# Patient Record
Sex: FEMALE | Race: WHITE | NOT HISPANIC OR LATINO | Employment: OTHER | URBAN - METROPOLITAN AREA
[De-identification: names, ages, dates, MRNs, and addresses within clinical notes are randomized per-mention and may not be internally consistent; named-entity substitution may affect disease eponyms.]

---

## 2023-12-11 ENCOUNTER — DOCUMENTATION ONLY (OUTPATIENT)
Dept: OTHER | Facility: CLINIC | Age: 68
End: 2023-12-11

## 2023-12-11 PROBLEM — S39.012A LUMBOSACRAL STRAIN, INITIAL ENCOUNTER: Status: ACTIVE | Noted: 2023-12-11

## 2023-12-11 PROBLEM — E66.3 OVERWEIGHT (BMI 25.0-29.9): Status: ACTIVE | Noted: 2023-12-11

## 2023-12-11 PROBLEM — R46.89 COGNITIVE AND BEHAVIORAL CHANGES: Status: ACTIVE | Noted: 2023-12-11

## 2023-12-11 PROBLEM — M70.61 TROCHANTERIC BURSITIS OF RIGHT HIP: Status: ACTIVE | Noted: 2023-12-11

## 2023-12-11 PROBLEM — F03.90 DEMENTIA WITHOUT BEHAVIORAL DISTURBANCE (H): Status: ACTIVE | Noted: 2023-12-11

## 2023-12-11 PROBLEM — R41.89 COGNITIVE AND BEHAVIORAL CHANGES: Status: ACTIVE | Noted: 2023-12-11

## 2023-12-11 PROBLEM — M25.552 PAIN OF LEFT HIP: Status: ACTIVE | Noted: 2023-12-11

## 2023-12-11 PROBLEM — Z87.898 HX OF ABNORMAL MAMMOGRAM: Status: ACTIVE | Noted: 2023-12-11

## 2024-01-03 ENCOUNTER — DOCUMENTATION ONLY (OUTPATIENT)
Dept: GERIATRICS | Facility: CLINIC | Age: 69
End: 2024-01-03

## 2024-01-03 ENCOUNTER — ASSISTED LIVING VISIT (OUTPATIENT)
Dept: GERIATRICS | Facility: CLINIC | Age: 69
End: 2024-01-03
Payer: MEDICARE

## 2024-01-03 VITALS
TEMPERATURE: 97.3 F | HEART RATE: 74 BPM | BODY MASS INDEX: 27.38 KG/M2 | RESPIRATION RATE: 20 BRPM | OXYGEN SATURATION: 95 % | HEIGHT: 61 IN | DIASTOLIC BLOOD PRESSURE: 59 MMHG | WEIGHT: 145 LBS | SYSTOLIC BLOOD PRESSURE: 118 MMHG

## 2024-01-03 DIAGNOSIS — E03.9 ACQUIRED HYPOTHYROIDISM: ICD-10-CM

## 2024-01-03 DIAGNOSIS — E66.3 OVERWEIGHT (BMI 25.0-29.9): ICD-10-CM

## 2024-01-03 DIAGNOSIS — I10 BENIGN ESSENTIAL HYPERTENSION: ICD-10-CM

## 2024-01-03 DIAGNOSIS — K21.00 GASTROESOPHAGEAL REFLUX DISEASE WITH ESOPHAGITIS WITHOUT HEMORRHAGE: ICD-10-CM

## 2024-01-03 DIAGNOSIS — R41.89 COGNITIVE AND BEHAVIORAL CHANGES: Primary | ICD-10-CM

## 2024-01-03 DIAGNOSIS — R46.89 COGNITIVE AND BEHAVIORAL CHANGES: Primary | ICD-10-CM

## 2024-01-03 PROCEDURE — 99345 HOME/RES VST NEW HIGH MDM 75: CPT | Performed by: NURSE PRACTITIONER

## 2024-01-03 RX ORDER — TRAZODONE HYDROCHLORIDE 50 MG/1
75 TABLET, FILM COATED ORAL AT BEDTIME
COMMUNITY
End: 2024-02-09

## 2024-01-03 RX ORDER — IBUPROFEN 200 MG
200-400 TABLET ORAL 3 TIMES DAILY PRN
COMMUNITY

## 2024-01-03 NOTE — LETTER
1/3/2024        RE: Milena Kim  [address] [facility] [address]        [facility] GERIATRICS    PRIMARY CARE PROVIDER AND CLINIC:  Morena Omer NP, [address] [place] [id number]  Chief Complaint   Patient presents with     [facility] Medical Record Number:  [id number]  Place of Service where encounter took place:  [facility] ([facility]) [[id number]]    Milena Kim  is a 68 year old  (1955), admitted to the above facility from  [facility]. Hospital stay 11/5/23 through 12/11/23..    HPI:    Patient resting in room upon visit. Has a history of dementia with falls. Recently evaluated in ED on 11/5/23 for mechanical fall with hip pain, x-ray neg for fracture. HPI difficult to obtain 2/2 baseline cognitive impairment. Staff report negative behaviors but difficulty sleeping throughout night.   -Able to recall family and past events. Was an  for several years and enjoyed her work.   -Denies CP, SOB and lightheadedness.   BP Readings from Last 3 Encounters:   01/03/24 118/59   12/11/23 130/68     Pulse Readings from Last 4 Encounters:   01/03/24 74   12/11/23 83     Wt Readings from Last 4 Encounters:   01/03/24 65.8 kg (145 lb)   12/11/23 65.3 kg (144 lb)       CODE STATUS/ADVANCE DIRECTIVES DISCUSSION:  No Order  CPR/Full code   ALLERGIES:   Allergies   Allergen Reactions     Penicillins       PAST MEDICAL HISTORY:   Past Medical History:   Diagnosis Date     Aftercare following right hip joint replacement surgery      Benign essential HTN      Cognitive and behavioral changes      Dementia without behavioral disturbance, psychotic disturbance, mood disturbance, or anxiety (H)      Ductal carcinoma in situ of right breast      Elevated platelet count      Gastroesophageal reflux disease, unspecified whether esophagitis present      Greater trochanteric pain syndrome of right lower  extremity      History of abnormal mammogram      Hyperlipemia      Hypothyroidism      Lumbosacral strain, initial encounter      Osteoarthrosis      Osteopenia      Overweight (BMI 25.0-29.9)      Primary localized osteoarthrosis of the hip, right      Recurrent major depression (H24)     Managed by [facility]     Thyroid nodule     FNA Negitive 8/5/21     Trochanteric bursitis of right hip      Visit for screening mammogram       PAST SURGICAL HISTORY:   has a past surgical history that includes appendectomy; Gallbladder surgery; hip surgery; Hysterectomy; Lumpectomy breast (Right, 10/13/2023); oral surgery tooth extraction wisdom; sinsus surgery; and tonsillectomy.  FAMILY HISTORY: family history includes Diabetes in her father; Hypertension in her father; Osteoarthritis in her father, maternal grandmother, and mother; Osteoporosis in her maternal grandmother and mother.  SOCIAL HISTORY:   reports that she has never smoked. She has never used smokeless tobacco. She reports that she does not drink alcohol and does not use drugs.  Patient's living condition: lives alone    Post Discharge Medication Reconciliation Status:   MED REC REQUIRED  Post Medication Reconciliation Status: patient was not discharged from an inpatient facility or TCU       Current Outpatient Medications   Medication Sig     donepezil (ARICEPT) 10 MG tablet Take 10 mg by mouth daily     fexofenadine (ALLEGRA) 180 MG tablet Take 180 mg by mouth daily as needed for allergies     ibuprofen (ADVIL/MOTRIN) 200 MG tablet Take 200-400 mg by mouth 3 times daily as needed for pain     levothyroxine (SYNTHROID/LEVOTHROID) 50 MCG tablet Take 50 mcg by mouth daily     Lidocaine (LIDOCARE) 4 % Patch Place 1 patch onto the skin every 24 hours To prevent lidocaine toxicity, patient should be patch free for 12 hrs daily.     lisinopril (ZESTRIL) 20 MG tablet Take 20 mg by mouth daily     multivitamin w/minerals (THERA-VIT-M) tablet Take 1 tablet  "by mouth daily     Omega-3 Fatty Acids (FISH OIL) 1200 MG CPDR Take 1,200 mg by mouth daily with food     omeprazole (PRILOSEC) 20 MG DR capsule Take 40 mg by mouth daily     simvastatin (ZOCOR) 40 MG tablet Take 40 mg by mouth at bedtime     traZODone (DESYREL) 50 MG tablet Take 50 mg by mouth at bedtime     venlafaxine (EFFEXOR XR) 75 MG 24 hr capsule Take 75 mg by mouth daily     vitamin E (TOCOPHEROL) 400 units (180 mg) capsule Take 400 Units by mouth daily     HYDROcodone-acetaminophen (NORCO) 5-325 MG tablet Take 1 tablet by mouth Every 4-6 hrs prn. No more than 8 IN 24 HR PERIOD (Patient not taking: Reported on 1/3/2024)     No current facility-administered medications for this visit.       ROS:  10 point ROS of systems including Constitutional, Eyes, Respiratory, Cardiovascular, Gastroenterology, Genitourinary, Integumentary, Musculoskeletal, Psychiatric were all negative except for pertinent positives noted in my HPI.    Vitals:  /59   Pulse 74   Temp 97.3  F (36.3  C)   Resp 20   Ht 1.549 m (5' 1\")   Wt 65.8 kg (145 lb)   SpO2 95%   BMI 27.40 kg/m    Exam:  A & O x 1, NAD. Lungs CTA, non labored. RRR, S1/S2 w/o murmur,gallop or rub.  no edema.  Abdomen soft, nontender, +BT'S. No focal neurological deficits.        Lab/Diagnostic data:  Recent labs in [facility] reviewed by me today.     ASSESSMENT/PLAN:  Cognitive and behavioral changes  Resides on secure dementia unit with assistance for ADLs and meals.  Continue to monitor, expect further decline with progression of diease including weight loss. Will increase trazodone for insomnia.   -Continue Aricept 10 mg/day.    -Continue namenda 10 mg BID    Overweight (BMI 25.0-29.9)  Body mass index is 27.4 kg/m .  -Encourage healthy meals and snacks  -Light exercise as tolerated.     Benign essential hypertension  Chronic, stable.  BP Readings from Last 3 Encounters:   01/03/24 118/59   12/11/23 130/68   -Continue lisinopril 20 mg/day   -monitor and make " adjustments as clinically indicated.     Gastroesophageal reflux disease with esophagitis without hemorrhage  Longstanding history.   -Continue omeprazole as ordered.     Acquired hypothyroidism  Chronic, stable  TSH   Date Value Ref Range Status   01/08/2024 1.78 0.30 - 4.20 uIU/mL Final   Continue levothyroxine 50 mcg/day  -TSH next lab day      Orders:  TSH, CBC, BMP next lab day   Increase trazodone 75 mg/HS    Total time spent with patient visit at the [facility] nursing [facility] was 45 min including patient visit and review of past records.     Electronically signed by:  Morena Omer NP                     Sincerely,        Morena Omer NP

## 2024-01-03 NOTE — PROGRESS NOTES
[facility] GERIATRICS    PRIMARY CARE PROVIDER AND CLINIC:  Morena Omer NP, [address] [facility] [address]  Chief Complaint   Patient presents with    [facility] Medical Record Number:  [id number]  Place of Service where encounter took place:  [facility] ([facility]) [[id number]]    Milena Kim  is a 68 year old  (1955), admitted to the above facility from  [facility] Emergency Department. Hospital stay 11/5/23 through 12/11/23..    HPI:    Patient resting in room upon visit. Has a history of dementia with falls. Recently evaluated in ED on 11/5/23 for mechanical fall with hip pain, x-ray neg for fracture. HPI difficult to obtain 2/2 baseline cognitive impairment. Staff report negative behaviors but difficulty sleeping throughout night.   -Able to recall family and past events. Was an  for several years and enjoyed her work.   -Denies CP, SOB and lightheadedness.   BP Readings from Last 3 Encounters:   01/03/24 118/59   12/11/23 130/68     Pulse Readings from Last 4 Encounters:   01/03/24 74   12/11/23 83     Wt Readings from Last 4 Encounters:   01/03/24 65.8 kg (145 lb)   12/11/23 65.3 kg (144 lb)       CODE STATUS/ADVANCE DIRECTIVES DISCUSSION:  No Order  CPR/Full code   ALLERGIES:   Allergies   Allergen Reactions    Penicillins       PAST MEDICAL HISTORY:   Past Medical History:   Diagnosis Date    Aftercare following right hip joint replacement surgery     Benign essential HTN     Cognitive and behavioral changes     Dementia without behavioral disturbance, psychotic disturbance, mood disturbance, or anxiety (H)     Ductal carcinoma in situ of right breast     Elevated platelet count     Gastroesophageal reflux disease, unspecified whether esophagitis present     Greater trochanteric pain syndrome of right lower extremity     History of abnormal mammogram     Hyperlipemia     Hypothyroidism     Lumbosacral strain,  initial encounter     Osteoarthrosis     Osteopenia     Overweight (BMI 25.0-29.9)     Primary localized osteoarthrosis of the hip, right     Recurrent major depression (H24)     Managed by [facility]    Thyroid nodule     FNA Negitive 8/5/21    Trochanteric bursitis of right hip     Visit for screening mammogram       PAST SURGICAL HISTORY:   has a past surgical history that includes appendectomy; Gallbladder surgery; hip surgery; Hysterectomy; Lumpectomy breast (Right, 10/13/2023); oral surgery tooth extraction wisdom; sinsus surgery; and tonsillectomy.  FAMILY HISTORY: family history includes Diabetes in her father; Hypertension in her father; Osteoarthritis in her father, maternal grandmother, and mother; Osteoporosis in her maternal grandmother and mother.  SOCIAL HISTORY:   reports that she has never smoked. She has never used smokeless tobacco. She reports that she does not drink alcohol and does not use drugs.  Patient's living condition: lives alone    Post Discharge Medication Reconciliation Status:   MED REC REQUIRED  Post Medication Reconciliation Status: patient was not discharged from an inpatient facility or TCU       Current Outpatient Medications   Medication Sig    donepezil (ARICEPT) 10 MG tablet Take 10 mg by mouth daily    fexofenadine (ALLEGRA) 180 MG tablet Take 180 mg by mouth daily as needed for allergies    ibuprofen (ADVIL/MOTRIN) 200 MG tablet Take 200-400 mg by mouth 3 times daily as needed for pain    levothyroxine (SYNTHROID/LEVOTHROID) 50 MCG tablet Take 50 mcg by mouth daily    Lidocaine (LIDOCARE) 4 % Patch Place 1 patch onto the skin every 24 hours To prevent lidocaine toxicity, patient should be patch free for 12 hrs daily.    lisinopril (ZESTRIL) 20 MG tablet Take 20 mg by mouth daily    multivitamin w/minerals (THERA-VIT-M) tablet Take 1 tablet by mouth daily    Omega-3 Fatty Acids (FISH OIL) 1200 MG CPDR Take 1,200 mg by mouth daily with food    omeprazole  "(PRILOSEC) 20 MG DR capsule Take 40 mg by mouth daily    simvastatin (ZOCOR) 40 MG tablet Take 40 mg by mouth at bedtime    traZODone (DESYREL) 50 MG tablet Take 50 mg by mouth at bedtime    venlafaxine (EFFEXOR XR) 75 MG 24 hr capsule Take 75 mg by mouth daily    vitamin E (TOCOPHEROL) 400 units (180 mg) capsule Take 400 Units by mouth daily    HYDROcodone-acetaminophen (NORCO) 5-325 MG tablet Take 1 tablet by mouth Every 4-6 hrs prn. No more than 8 IN 24 HR PERIOD (Patient not taking: Reported on 1/3/2024)     No current facility-administered medications for this visit.       ROS:  10 point ROS of systems including Constitutional, Eyes, Respiratory, Cardiovascular, Gastroenterology, Genitourinary, Integumentary, Musculoskeletal, Psychiatric were all negative except for pertinent positives noted in my HPI.    Vitals:  /59   Pulse 74   Temp 97.3  F (36.3  C)   Resp 20   Ht 1.549 m (5' 1\")   Wt 65.8 kg (145 lb)   SpO2 95%   BMI 27.40 kg/m    Exam:  A & O x 1, NAD. Lungs CTA, non labored. RRR, S1/S2 w/o murmur,gallop or rub.  no edema.  Abdomen soft, nontender, +BT'S. No focal neurological deficits.        Lab/Diagnostic data:  Recent labs in [facility] reviewed by me today.     ASSESSMENT/PLAN:  Cognitive and behavioral changes  Resides on secure dementia unit with assistance for ADLs and meals.  Continue to monitor, expect further decline with progression of diease including weight loss. Will increase trazodone for insomnia.   -Continue Aricept 10 mg/day.    -Continue namenda 10 mg BID    Overweight (BMI 25.0-29.9)  Body mass index is 27.4 kg/m .  -Encourage healthy meals and snacks  -Light exercise as tolerated.     Benign essential hypertension  Chronic, stable.  BP Readings from Last 3 Encounters:   01/03/24 118/59   12/11/23 130/68   -Continue lisinopril 20 mg/day   -monitor and make adjustments as clinically indicated.     Gastroesophageal reflux disease with esophagitis without hemorrhage  Longstanding " history.   -Continue omeprazole as ordered.     Acquired hypothyroidism  Chronic, stable  TSH   Date Value Ref Range Status   01/08/2024 1.78 0.30 - 4.20 uIU/mL Final   Continue levothyroxine 50 mcg/day  -TSH next lab day      Orders:  TSH, CBC, BMP next lab day   Increase trazodone 75 mg/HS    Total time spent with patient visit at the [facility] nursing [facility] was 45 min including patient visit and review of past records.     Electronically signed by:  Morena Omer NP

## 2024-01-07 ENCOUNTER — LAB REQUISITION (OUTPATIENT)
Dept: LAB | Facility: CLINIC | Age: 69
End: 2024-01-07
Payer: MEDICARE

## 2024-01-07 DIAGNOSIS — D64.9 ANEMIA, UNSPECIFIED: ICD-10-CM

## 2024-01-07 DIAGNOSIS — I10 ESSENTIAL (PRIMARY) HYPERTENSION: ICD-10-CM

## 2024-01-07 DIAGNOSIS — E03.9 HYPOTHYROIDISM, UNSPECIFIED: ICD-10-CM

## 2024-01-08 LAB
ANION GAP SERPL CALCULATED.3IONS-SCNC: 13 MMOL/L (ref 7–15)
BASOPHILS # BLD AUTO: 0.1 10E3/UL (ref 0–0.2)
BASOPHILS NFR BLD AUTO: 1 %
BUN SERPL-MCNC: 11.3 MG/DL (ref 8–23)
CALCIUM SERPL-MCNC: 9.9 MG/DL (ref 8.8–10.2)
CHLORIDE SERPL-SCNC: 99 MMOL/L (ref 98–107)
CREAT SERPL-MCNC: 0.67 MG/DL (ref 0.51–0.95)
DEPRECATED HCO3 PLAS-SCNC: 26 MMOL/L (ref 22–29)
EGFRCR SERPLBLD CKD-EPI 2021: >90 ML/MIN/1.73M2
EOSINOPHIL # BLD AUTO: 0.1 10E3/UL (ref 0–0.7)
EOSINOPHIL NFR BLD AUTO: 1 %
ERYTHROCYTE [DISTWIDTH] IN BLOOD BY AUTOMATED COUNT: 13.8 % (ref 10–15)
GLUCOSE SERPL-MCNC: 116 MG/DL (ref 70–99)
HCT VFR BLD AUTO: 43.5 % (ref 35–47)
HGB BLD-MCNC: 14.1 G/DL (ref 11.7–15.7)
IMM GRANULOCYTES # BLD: 0 10E3/UL
IMM GRANULOCYTES NFR BLD: 0 %
LYMPHOCYTES # BLD AUTO: 1.9 10E3/UL (ref 0.8–5.3)
LYMPHOCYTES NFR BLD AUTO: 23 %
MCH RBC QN AUTO: 29.2 PG (ref 26.5–33)
MCHC RBC AUTO-ENTMCNC: 32.4 G/DL (ref 31.5–36.5)
MCV RBC AUTO: 90 FL (ref 78–100)
MONOCYTES # BLD AUTO: 0.7 10E3/UL (ref 0–1.3)
MONOCYTES NFR BLD AUTO: 8 %
NEUTROPHILS # BLD AUTO: 5.5 10E3/UL (ref 1.6–8.3)
NEUTROPHILS NFR BLD AUTO: 67 %
NRBC # BLD AUTO: 0 10E3/UL
NRBC BLD AUTO-RTO: 0 /100
PLATELET # BLD AUTO: 430 10E3/UL (ref 150–450)
POTASSIUM SERPL-SCNC: 4.4 MMOL/L (ref 3.4–5.3)
RBC # BLD AUTO: 4.83 10E6/UL (ref 3.8–5.2)
SODIUM SERPL-SCNC: 138 MMOL/L (ref 135–145)
TSH SERPL DL<=0.005 MIU/L-ACNC: 1.78 UIU/ML (ref 0.3–4.2)
WBC # BLD AUTO: 8.3 10E3/UL (ref 4–11)

## 2024-01-08 PROCEDURE — P9604 ONE-WAY ALLOW PRORATED TRIP: HCPCS | Mod: ORL | Performed by: NURSE PRACTITIONER

## 2024-01-08 PROCEDURE — 80048 BASIC METABOLIC PNL TOTAL CA: CPT | Mod: ORL | Performed by: NURSE PRACTITIONER

## 2024-01-08 PROCEDURE — 36415 COLL VENOUS BLD VENIPUNCTURE: CPT | Mod: ORL | Performed by: NURSE PRACTITIONER

## 2024-01-08 PROCEDURE — 84443 ASSAY THYROID STIM HORMONE: CPT | Mod: ORL | Performed by: NURSE PRACTITIONER

## 2024-01-08 PROCEDURE — 85025 COMPLETE CBC W/AUTO DIFF WBC: CPT | Mod: ORL | Performed by: NURSE PRACTITIONER

## 2024-01-26 ENCOUNTER — TELEPHONE (OUTPATIENT)
Dept: GERIATRICS | Facility: CLINIC | Age: 69
End: 2024-01-26
Payer: MEDICARE

## 2024-01-26 NOTE — TELEPHONE ENCOUNTER
Prior Authorization Retail Medication Request    Medication/Dose: NAMENDA 10 MG  Diagnosis and ICD code (if different than what is on RX):    New/renewal/insurance change PA/secondary ins. PA:  Previously Tried and Failed:    Rationale:            Pharmacy Information (if different than what is on RX)  Name:  [facility]   Phone:  [phone]  Fax: [phone]

## 2024-01-27 NOTE — TELEPHONE ENCOUNTER
I do not see a current script from a [place] provider for this medication in epic; the PA team is unable to do a prior authorization as we would need to know the medication strength/sig/diagnosis in order to initiate an authorization.

## 2024-02-07 NOTE — TELEPHONE ENCOUNTER
I do not see a current script from a [place] provider for this medication in epic; the PA team is unable to do a prior authorization as we would need to know the medication strength/sig/diagnosis in order to initiate an authorization.

## 2024-02-08 ENCOUNTER — TELEPHONE (OUTPATIENT)
Dept: GERIATRICS | Facility: CLINIC | Age: 69
End: 2024-02-08
Payer: MEDICARE

## 2024-02-08 DIAGNOSIS — G47.00 INSOMNIA: ICD-10-CM

## 2024-02-08 DIAGNOSIS — Z78.9 TAKES DIETARY SUPPLEMENTS: Primary | ICD-10-CM

## 2024-02-09 RX ORDER — FOLIC ACID/MULTIVIT,IRON,MINER 0.4MG-18MG
1 TABLET ORAL DAILY
Qty: 90 CAPSULE | Refills: 97 | Status: SHIPPED | OUTPATIENT
Start: 2024-02-09

## 2024-02-09 RX ORDER — MULTIVITAMIN WITH FOLIC ACID 400 MCG
1 TABLET ORAL DAILY
Qty: 90 TABLET | Refills: 97 | Status: SHIPPED | OUTPATIENT
Start: 2024-02-09

## 2024-02-09 RX ORDER — VITAMIN E 268 MG
1 CAPSULE ORAL DAILY
Qty: 90 CAPSULE | Refills: 97 | Status: SHIPPED | OUTPATIENT
Start: 2024-02-09

## 2024-02-09 RX ORDER — TRAZODONE HYDROCHLORIDE 50 MG/1
TABLET, FILM COATED ORAL
Qty: 135 TABLET | Refills: 97 | Status: SHIPPED | OUTPATIENT
Start: 2024-02-09

## 2024-02-09 NOTE — TELEPHONE ENCOUNTER
Morena Watts    Joselyn AGUILERA. Is a newer resident to [facility] for the [facility]. We need a prior authorization done for Joselyn's Namenda 10 mg. The [facility] Pool will not do the prior auth. Because there is no order in her chart from a  doctor for it. They need to know   medication strength/sig/diagnosis in order to initiate an authorization.             I have attached Joselyn's admission orders to this telephone call for your review. If you any questions or concerns, please reach out to me. Thank you for your time. I appreciate it.      Carolee Singh [facility]  [address] [place] [address]  [email or web address]  Pharmacy: [phone] Fax: [phone]

## 2024-02-16 ENCOUNTER — TELEPHONE (OUTPATIENT)
Dept: GERIATRICS | Facility: CLINIC | Age: 69
End: 2024-02-16
Payer: MEDICARE

## 2024-02-16 ENCOUNTER — DOCUMENTATION ONLY (OUTPATIENT)
Dept: GERIATRICS | Facility: CLINIC | Age: 69
End: 2024-02-16
Payer: MEDICARE

## 2024-02-16 DIAGNOSIS — R46.89 COGNITIVE AND BEHAVIORAL CHANGES: Primary | ICD-10-CM

## 2024-02-16 DIAGNOSIS — R41.89 COGNITIVE AND BEHAVIORAL CHANGES: Primary | ICD-10-CM

## 2024-02-16 DIAGNOSIS — F03.90 DEMENTIA (H): ICD-10-CM

## 2024-02-16 RX ORDER — MEMANTINE HYDROCHLORIDE 10 MG/1
10 TABLET ORAL 2 TIMES DAILY
Start: 2024-02-16

## 2024-02-16 RX ORDER — MEMANTINE HYDROCHLORIDE 10 MG/1
10 TABLET ORAL 2 TIMES DAILY
COMMUNITY
Start: 2023-12-08 | End: 2024-02-16

## 2024-02-16 NOTE — TELEPHONE ENCOUNTER
Central Prior Authorization Team   Phone: [phone]    PA Initiation    Medication: Memantine 10mg tablet  Insurance Company: [employer] - Phone [phone] Fax [phone]  Pharmacy Filling the Rx: [facility] - [place], [place] - [address] [place] [address]  Filling Pharmacy Phone:    Filling Pharmacy Fax:    Start Date: 2/16/2024

## 2024-02-16 NOTE — TELEPHONE ENCOUNTER
Prior Authorization Retail Medication Request    Medication/Dose: Memantine 10mg tablet  ICD code (if different than what is on RX):  F03.90  Previously Tried and Failed:  Aricept, Effexor XR, Trazodone  Rationale:  Memantine 10mg by mouth twice daily.    Insurance Name:  [employer]  Insurance ID:  [id number]    Secondary Insurance Name:  Medicare  Secondary Insurance ID:  [id number]      Pharmacy Information (if different than what is on RX)  Name:  [facility] - [address] [facility], [address]    Phone: [phone]

## 2024-02-21 NOTE — TELEPHONE ENCOUNTER
Prior Authorization Not Needed per Insurance    Medication: Memantine 10mg tablet  Insurance Company: [employer] - Phone [phone] Fax [phone]  Expected CoPay:      Pharmacy Filling the Rx: [facility] - [place], [place] - [address] [place] [address]  Pharmacy Notified:  Yes  Pharmacy has paid claim

## 2024-03-04 ENCOUNTER — ASSISTED LIVING VISIT (OUTPATIENT)
Dept: GERIATRICS | Facility: CLINIC | Age: 69
End: 2024-03-04
Payer: MEDICARE

## 2024-03-04 VITALS
DIASTOLIC BLOOD PRESSURE: 75 MMHG | WEIGHT: 132.2 LBS | HEART RATE: 79 BPM | RESPIRATION RATE: 16 BRPM | OXYGEN SATURATION: 95 % | HEIGHT: 61 IN | BODY MASS INDEX: 24.96 KG/M2 | TEMPERATURE: 96.8 F | SYSTOLIC BLOOD PRESSURE: 133 MMHG

## 2024-03-04 DIAGNOSIS — R41.89 COGNITIVE AND BEHAVIORAL CHANGES: Primary | ICD-10-CM

## 2024-03-04 DIAGNOSIS — K21.00 GASTROESOPHAGEAL REFLUX DISEASE WITH ESOPHAGITIS WITHOUT HEMORRHAGE: ICD-10-CM

## 2024-03-04 DIAGNOSIS — E03.9 ACQUIRED HYPOTHYROIDISM: ICD-10-CM

## 2024-03-04 DIAGNOSIS — R46.89 COGNITIVE AND BEHAVIORAL CHANGES: Primary | ICD-10-CM

## 2024-03-04 PROCEDURE — 99350 HOME/RES VST EST HIGH MDM 60: CPT | Performed by: NURSE PRACTITIONER

## 2024-03-04 NOTE — LETTER
"    3/4/2024        RE: Milena Kim  [address] [facility] [address]        [facility] GERIATRICS    Chief Complaint   Patient presents with     RECHECK     UA/UC increased confusion     HPI:  Milena Kim is a 68 year old  (1955), who is being seen today for an episodic care visit at: [facility]) [[id number]]. Today's concern is:   -Per facility report, patient is having increased confusion from baseline.  HPI difficult to obtain secondary to baseline cognitive impairment.  Does report she has more confusion with UTIs.  Staff is toileting her more frequently.  -Per chart review TSH does remain on the low side, currently taking Synthroid 50 mcg p.o. daily.  -Discussed GERD symptoms, patient denies dyspepsia.  -No further concerns at this time.  Patient denies chest pain, shortness of breath, lightheadedness.    BP Readings from Last 3 Encounters:   03/04/24 133/75   01/29/24 118/59   01/03/24 118/59     Pulse Readings from Last 4 Encounters:   03/04/24 79   01/29/24 74   01/03/24 74   12/11/23 83     Wt Readings from Last 2 Encounters:   03/04/24 60 kg (132 lb 3.2 oz)   01/29/24 65.8 kg (145 lb)       Allergies, and PMH/PSH reviewed in [facility] today.  REVIEW OF SYSTEMS:  Unobtainable secondary to cognitive impairment.     Objective:   /75   Pulse 79   Temp 96.8  F (36  C)   Resp 16   Ht 1.549 m (5' 1\")   Wt 60 kg (132 lb 3.2 oz)   SpO2 95%   BMI 24.98 kg/m    A & O x 1, NAD. Lungs CTA, non labored. RRR, S1/S2 w/o murmur,gallop or rub.  No edema.  Abdomen soft, nontender, +BT'S. No focal neurological deficits.        Recent labs in [facility] reviewed by me today.     Assessment/Plan:     Cognitive and behavioral changes  Acquired hypothyroidism  Gastroesophageal reflux disease with esophagitis without hemorrhage  Provider reviewed records from facility, and interpreted most recent imaging/lab work, and vital signs.   Chronic progressive dementia.  Staff report " increased confusion.  Obtain UA/UC.  Continue memory care support with medication ministration, meals, safety.  Expect further decline with progression of disease including weight loss.  Longstanding history of GERD.  Will trial dose reduction of omeprazole to 30 mg p.o. daily.  Continue to monitor for signs and symptoms of dyspepsia and contact primary care provider if occur.  TSH remains lower than goal.  Will reduce Synthroid to 25 mcg/day.  Recheck TSH in 8 weeks on lab day.    MED REC REQUIRED  Post Medication Reconciliation Status: patient was not discharged from an inpatient facility or TCU      Orders:  See new orders above       **family updated on changes.     Electronically signed by: Morena Omer NP           Sincerely,        Morena Omer NP

## 2024-03-04 NOTE — PROGRESS NOTES
"[facility] GERIATRICS    Chief Complaint   Patient presents with    RECHECK     UA/UC increased confusion     HPI:  Milena Kim is a 68 year old  (1955), who is being seen today for an episodic care visit at: [facility]) [[id number]]. Today's concern is:   -Per facility report, patient is having increased confusion from baseline.  HPI difficult to obtain secondary to baseline cognitive impairment.  Does report she has more confusion with UTIs.  Staff is toileting her more frequently.  -Per chart review TSH does remain on the low side, currently taking Synthroid 50 mcg p.o. daily.  -Discussed GERD symptoms, patient denies dyspepsia.  -No further concerns at this time.  Patient denies chest pain, shortness of breath, lightheadedness.    BP Readings from Last 3 Encounters:   03/04/24 133/75   01/29/24 118/59   01/03/24 118/59     Pulse Readings from Last 4 Encounters:   03/04/24 79   01/29/24 74   01/03/24 74   12/11/23 83     Wt Readings from Last 2 Encounters:   03/04/24 60 kg (132 lb 3.2 oz)   01/29/24 65.8 kg (145 lb)       Allergies, and PMH/PSH reviewed in [facility] today.  REVIEW OF SYSTEMS:  Unobtainable secondary to cognitive impairment.     Objective:   /75   Pulse 79   Temp 96.8  F (36  C)   Resp 16   Ht 1.549 m (5' 1\")   Wt 60 kg (132 lb 3.2 oz)   SpO2 95%   BMI 24.98 kg/m    A & O x 1, NAD. Lungs CTA, non labored. RRR, S1/S2 w/o murmur,gallop or rub.  No edema.  Abdomen soft, nontender, +BT'S. No focal neurological deficits.        Recent labs in [facility] reviewed by me today.     Assessment/Plan:     Cognitive and behavioral changes  Acquired hypothyroidism  Gastroesophageal reflux disease with esophagitis without hemorrhage  Provider reviewed records from facility, and interpreted most recent imaging/lab work, and vital signs.   Chronic progressive dementia.  Staff report increased confusion.  Obtain UA/UC.  Continue memory care support with medication ministration, " meals, safety.  Expect further decline with progression of disease including weight loss.  Longstanding history of GERD.  Will trial dose reduction of omeprazole to 30 mg p.o. daily.  Continue to monitor for signs and symptoms of dyspepsia and contact primary care provider if occur.  TSH remains lower than goal.  Will reduce Synthroid to 25 mcg/day.  Recheck TSH in 8 weeks on lab day.    MED REC REQUIRED  Post Medication Reconciliation Status: patient was not discharged from an inpatient facility or TCU      Orders:  See new orders above       **family updated on changes.     Electronically signed by: Morena Omer NP

## 2024-03-05 ENCOUNTER — LAB REQUISITION (OUTPATIENT)
Dept: LAB | Facility: CLINIC | Age: 69
End: 2024-03-05
Payer: MEDICARE

## 2024-03-05 DIAGNOSIS — R35.0 FREQUENCY OF MICTURITION: ICD-10-CM

## 2024-03-05 LAB
ALBUMIN UR-MCNC: NEGATIVE MG/DL
APPEARANCE UR: CLEAR
BILIRUB UR QL STRIP: NEGATIVE
COLOR UR AUTO: YELLOW
GLUCOSE UR STRIP-MCNC: NEGATIVE MG/DL
HGB UR QL STRIP: NEGATIVE
KETONES UR STRIP-MCNC: NEGATIVE MG/DL
LEUKOCYTE ESTERASE UR QL STRIP: ABNORMAL
MUCOUS THREADS #/AREA URNS LPF: PRESENT /LPF
NITRATE UR QL: NEGATIVE
PH UR STRIP: 5.5 [PH] (ref 5–7)
RBC URINE: 0 /HPF
SP GR UR STRIP: 1.01 (ref 1–1.03)
SQUAMOUS EPITHELIAL: 1 /HPF
TRANSITIONAL EPI: <1 /HPF
UROBILINOGEN UR STRIP-MCNC: NORMAL MG/DL
WBC URINE: 11 /HPF

## 2024-03-05 PROCEDURE — 81001 URINALYSIS AUTO W/SCOPE: CPT | Mod: ORL | Performed by: NURSE PRACTITIONER

## 2024-03-05 PROCEDURE — 87086 URINE CULTURE/COLONY COUNT: CPT | Mod: ORL | Performed by: NURSE PRACTITIONER

## 2024-03-07 LAB — BACTERIA UR CULT: NORMAL

## 2024-04-01 ENCOUNTER — ASSISTED LIVING VISIT (OUTPATIENT)
Dept: GERIATRICS | Facility: CLINIC | Age: 69
End: 2024-04-01
Payer: MEDICARE

## 2024-04-01 VITALS
DIASTOLIC BLOOD PRESSURE: 72 MMHG | TEMPERATURE: 97.3 F | HEART RATE: 74 BPM | RESPIRATION RATE: 20 BRPM | OXYGEN SATURATION: 97 % | BODY MASS INDEX: 24.84 KG/M2 | SYSTOLIC BLOOD PRESSURE: 161 MMHG | HEIGHT: 61 IN | WEIGHT: 131.6 LBS

## 2024-04-01 DIAGNOSIS — K21.9 GASTROESOPHAGEAL REFLUX DISEASE, UNSPECIFIED WHETHER ESOPHAGITIS PRESENT: ICD-10-CM

## 2024-04-01 DIAGNOSIS — K21.00 GASTROESOPHAGEAL REFLUX DISEASE WITH ESOPHAGITIS: ICD-10-CM

## 2024-04-01 DIAGNOSIS — E03.9 HYPOTHYROIDISM, UNSPECIFIED: Primary | ICD-10-CM

## 2024-04-01 DIAGNOSIS — F03.B0 MODERATE DEMENTIA, UNSPECIFIED DEMENTIA TYPE, UNSPECIFIED WHETHER BEHAVIORAL, PSYCHOTIC, OR MOOD DISTURBANCE OR ANXIETY (H): Primary | ICD-10-CM

## 2024-04-01 DIAGNOSIS — R52 PAIN: ICD-10-CM

## 2024-04-01 DIAGNOSIS — Z72.89 INAPPROPRIATE SEXUAL BEHAVIOR: ICD-10-CM

## 2024-04-01 DIAGNOSIS — E44.1 MILD PROTEIN MALNUTRITION (H): ICD-10-CM

## 2024-04-01 PROBLEM — F03.918 DEMENTIA WITH OTHER BEHAVIORAL DISTURBANCE, UNSPECIFIED DEMENTIA SEVERITY, UNSPECIFIED DEMENTIA TYPE (H): Status: ACTIVE | Noted: 2024-04-01

## 2024-04-01 PROCEDURE — 99349 HOME/RES VST EST MOD MDM 40: CPT | Performed by: NURSE PRACTITIONER

## 2024-04-01 RX ORDER — ACETAMINOPHEN 500 MG
1000 TABLET ORAL 2 TIMES DAILY PRN
Status: SHIPPED
Start: 2024-04-01
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
## 2024-04-02 RX ORDER — OMEPRAZOLE 10 MG/1
CAPSULE, DELAYED RELEASE ORAL
Qty: 270 CAPSULE | Refills: 97 | Status: SHIPPED | OUTPATIENT
Start: 2024-04-02

## 2024-04-02 RX ORDER — LEVOTHYROXINE SODIUM 25 UG/1
25 TABLET ORAL DAILY
Qty: 90 TABLET | Refills: 97 | Status: SHIPPED | OUTPATIENT
Start: 2024-04-02

## 2024-04-26 ENCOUNTER — LAB REQUISITION (OUTPATIENT)
Dept: LAB | Facility: CLINIC | Age: 69
End: 2024-04-26
Payer: MEDICARE

## 2024-04-26 DIAGNOSIS — E03.9 HYPOTHYROIDISM, UNSPECIFIED: ICD-10-CM

## 2024-04-29 LAB — TSH SERPL DL<=0.005 MIU/L-ACNC: 2.97 UIU/ML (ref 0.3–4.2)

## 2024-04-29 PROCEDURE — P9604 ONE-WAY ALLOW PRORATED TRIP: HCPCS | Mod: ORL | Performed by: NURSE PRACTITIONER

## 2024-04-29 PROCEDURE — 84443 ASSAY THYROID STIM HORMONE: CPT | Mod: ORL | Performed by: NURSE PRACTITIONER

## 2024-04-29 PROCEDURE — 36415 COLL VENOUS BLD VENIPUNCTURE: CPT | Mod: ORL | Performed by: NURSE PRACTITIONER

## 2024-07-28 ENCOUNTER — HEALTH MAINTENANCE LETTER (OUTPATIENT)
Age: 69
End: 2024-07-28

## 2024-08-01 ENCOUNTER — TELEPHONE (OUTPATIENT)
Dept: GERIATRICS | Facility: CLINIC | Age: 69
End: 2024-08-01
Payer: MEDICARE

## 2024-08-01 PROCEDURE — 81001 URINALYSIS AUTO W/SCOPE: CPT | Mod: ORL | Performed by: NURSE PRACTITIONER

## 2024-08-01 PROCEDURE — 87086 URINE CULTURE/COLONY COUNT: CPT | Mod: ORL | Performed by: NURSE PRACTITIONER

## 2024-08-01 NOTE — TELEPHONE ENCOUNTER
[facility] Triage Nurse Telephone Encounter    Provider: RICK Corona CNP  Facility: [facility] Facility Type:  AL    Caller: Nurse  Call Back Number:     Allergies:    Allergies   Allergen Reactions    Penicillins         Reason for call: Nurse called to report that patient has had an increase in confusion.  Urine is also starting to become odorous.  Afebrile.  Nurse is wanting an order for a UA/UC.        Verbal Order/Direction given by Provider: Okay for UA/UC.      Provider giving Order:  RICK Corona CNP    Verbal Order given to: Nurse    Soheila Maza RN

## 2024-08-02 ENCOUNTER — LAB REQUISITION (OUTPATIENT)
Dept: LAB | Facility: CLINIC | Age: 69
End: 2024-08-02
Payer: MEDICARE

## 2024-08-02 DIAGNOSIS — N39.0 URINARY TRACT INFECTION, SITE NOT SPECIFIED: ICD-10-CM

## 2024-08-02 LAB
ALBUMIN UR-MCNC: NEGATIVE MG/DL
APPEARANCE UR: CLEAR
BILIRUB UR QL STRIP: NEGATIVE
COLOR UR AUTO: ABNORMAL
GLUCOSE UR STRIP-MCNC: NEGATIVE MG/DL
HGB UR QL STRIP: NEGATIVE
KETONES UR STRIP-MCNC: NEGATIVE MG/DL
LEUKOCYTE ESTERASE UR QL STRIP: ABNORMAL
MUCOUS THREADS #/AREA URNS LPF: PRESENT /LPF
NITRATE UR QL: NEGATIVE
PH UR STRIP: 6.5 [PH] (ref 5–7)
RBC URINE: 1 /HPF
SP GR UR STRIP: 1.01 (ref 1–1.03)
SQUAMOUS EPITHELIAL: <1 /HPF
TRANSITIONAL EPI: <1 /HPF
UROBILINOGEN UR STRIP-MCNC: NORMAL MG/DL
WBC URINE: 23 /HPF

## 2024-08-03 LAB — BACTERIA UR CULT: NORMAL

## 2024-08-13 ENCOUNTER — ASSISTED LIVING VISIT (OUTPATIENT)
Dept: GERIATRICS | Facility: CLINIC | Age: 69
End: 2024-08-13
Payer: MEDICARE

## 2024-08-13 VITALS
WEIGHT: 129 LBS | RESPIRATION RATE: 15 BRPM | HEIGHT: 61 IN | HEART RATE: 70 BPM | TEMPERATURE: 97.3 F | SYSTOLIC BLOOD PRESSURE: 127 MMHG | BODY MASS INDEX: 24.35 KG/M2 | DIASTOLIC BLOOD PRESSURE: 74 MMHG | OXYGEN SATURATION: 96 %

## 2024-08-13 DIAGNOSIS — K21.00 GASTROESOPHAGEAL REFLUX DISEASE WITH ESOPHAGITIS WITHOUT HEMORRHAGE: ICD-10-CM

## 2024-08-13 DIAGNOSIS — I10 BENIGN ESSENTIAL HYPERTENSION: ICD-10-CM

## 2024-08-13 DIAGNOSIS — E03.9 ACQUIRED HYPOTHYROIDISM: ICD-10-CM

## 2024-08-13 DIAGNOSIS — F03.B0 MODERATE DEMENTIA, UNSPECIFIED DEMENTIA TYPE, UNSPECIFIED WHETHER BEHAVIORAL, PSYCHOTIC, OR MOOD DISTURBANCE OR ANXIETY (H): Primary | ICD-10-CM

## 2024-08-13 DIAGNOSIS — R52 PAIN: ICD-10-CM

## 2024-08-13 DIAGNOSIS — E44.1 MILD PROTEIN MALNUTRITION (H): ICD-10-CM

## 2024-08-13 PROCEDURE — 99350 HOME/RES VST EST HIGH MDM 60: CPT | Performed by: NURSE PRACTITIONER

## 2024-08-13 NOTE — LETTER
8/13/2024      Milena Kim  [address]  [facility] [address]        [facility] GERIATRICS  Chief Complaint   Patient presents with     Annual Comprehensive Nursing Home     [facility] Medical Record Number:  [id number]  Place of Service where encounter took place:  [facility] ([facility]) [[id number]]    HPI:    Milena Kim  is a 69 year old  (1955), who is being seen today for an annual comprehensive visit. HPI information obtained from: facility chart records, facility staff, and patient report.   Patient sitting in general commons area and participating with group activity.  Cooperative with writer's exam.  Talk to writer about being a  and enjoying playing the piano.  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  HPI difficult to obtain secondary to baseline cognitive impairment, but patient denies pain, chest pain, shortness of breath and lightheadedness.  Writer talked with son Juvencio who had no concerns at this time.    Vital signs reviewed by me today  BP Readings from Last 3 Encounters:   08/13/24 127/74   04/01/24 (!) 161/72   03/04/24 133/75     Pulse Readings from Last 4 Encounters:   08/13/24 70   04/01/24 74   03/04/24 79   01/29/24 74     Wt Readings from Last 4 Encounters:   08/13/24 58.5 kg (129 lb)   04/01/24 59.7 kg (131 lb 9.6 oz)   03/04/24 60 kg (132 lb 3.2 oz)   01/29/24 65.8 kg (145 lb)         ALLERGIES: Penicillins  PAST MEDICAL HISTORY:   Past Medical History:   Diagnosis Date     Aftercare following right hip joint replacement surgery      Benign essential HTN      Cognitive and behavioral changes      Dementia without behavioral disturbance, psychotic disturbance, mood disturbance, or anxiety (H)      Ductal carcinoma in situ of right breast      Elevated platelet count      Gastroesophageal reflux disease, unspecified whether esophagitis present      Greater trochanteric pain syndrome of right lower  extremity      History of abnormal mammogram      Hyperlipemia      Hypothyroidism      Lumbosacral strain, initial encounter      Osteoarthrosis      Osteopenia      Overweight (BMI 25.0-29.9)      Primary localized osteoarthrosis of the hip, right      Recurrent major depression (H24)     Managed by [facility]     Thyroid nodule     FNA Negitive 8/5/21     Trochanteric bursitis of right hip      Visit for screening mammogram       PAST SURGICAL HISTORY:  has a past surgical history that includes appendectomy; Gallbladder surgery; hip surgery; Hysterectomy; Lumpectomy breast (Right, 10/13/2023); oral surgery tooth extraction wisdom; sinsus surgery; and tonsillectomy.      Current Outpatient Medications:      acetaminophen (TYLENOL) 500 MG tablet, Take 2 tablets (1,000 mg) by mouth 2 times daily as needed for mild pain, Disp: , Rfl:      donepezil (ARICEPT) 10 MG tablet, Take 10 mg by mouth daily, Disp: , Rfl:      fexofenadine (ALLEGRA) 180 MG tablet, Take 180 mg by mouth daily as needed for allergies, Disp: , Rfl:      ibuprofen (ADVIL/MOTRIN) 200 MG tablet, Take 200-400 mg by mouth 3 times daily as needed for pain, Disp: , Rfl:      levothyroxine (SYNTHROID/LEVOTHROID) 25 MCG tablet, TAKE 1 TABLET BY MOUTH ONCE DAILY, Disp: 90 tablet, Rfl: 97     levothyroxine (SYNTHROID/LEVOTHROID) 50 MCG tablet, Take 25 mcg by mouth daily, Disp: , Rfl:      Lidocaine (LIDOCARE) 4 % Patch, Place 1 patch onto the skin every 24 hours To prevent lidocaine toxicity, patient should be patch free for 12 hrs daily., Disp: , Rfl:      lisinopril (ZESTRIL) 20 MG tablet, Take 20 mg by mouth daily, Disp: , Rfl:      memantine (NAMENDA) 10 MG tablet, Take 1 tablet (10 mg) by mouth 2 times daily, Disp: , Rfl:      Multiple Vitamin (TAB-A-GAUDENCIO) TABS, TAKE 1 TABLET BY MOUTH ONCE DAILY, Disp: 90 tablet, Rfl: 97     multivitamin w/minerals (THERA-VIT-M) tablet, Take 1 tablet by mouth daily, Disp: , Rfl:      Omega-3 Fatty Acids  (FISH OIL) 1200 MG CPDR, Take 1,200 mg by mouth daily with food, Disp: , Rfl:      Omega-3 Fatty Acids (OMEGA-3 FISH OIL) 1200 MG CAPS, TAKE 1 CAPSULE BY MOUTH ONCE DAILY, Disp: 90 capsule, Rfl: 97     omeprazole (PRILOSEC) 10 MG DR capsule, TAKE THREE CAPSULES (30MG) BY MOUTH ONCE DAILY, Disp: 270 capsule, Rfl: 97     omeprazole (PRILOSEC) 20 MG DR capsule, Take 30 mg by mouth daily, Disp: , Rfl:      simvastatin (ZOCOR) 40 MG tablet, Take 40 mg by mouth at bedtime, Disp: , Rfl:      traZODone (DESYREL) 50 MG tablet, TAKE ONE AND ONE-HALF TABLETS (75MG) BY MOUTH EVERY NIGHT AT BEDTIME, Disp: 135 tablet, Rfl: 97     venlafaxine (EFFEXOR XR) 75 MG 24 hr capsule, Take 75 mg by mouth daily, Disp: , Rfl:      vitamin E (TOCOPHEROL) 400 units (180 mg) capsule, Take 400 Units by mouth daily, Disp: , Rfl:      Vitamin E 180 MG (400 UNIT) CAPS, TAKE 1 CAPSULE BY MOUTH ONCE DAILY, Disp: 90 capsule, Rfl: 97     MED REC REQUIRED  Post Medication Reconciliation Status: patient was not discharged from an inpatient facility or TCU      Case Management:  I have reviewed the Assisted Living care plan, current immunizations and preventive care/cancer screening.. Future cancer screening is not clinically indicated secondary to age/goals of care. Patient's desire to return to the community is not present. Current Level of Care is appropriate.mhgeroimmunization: Annual Influenza per facility protocol    Advance Directive Discussion:    I reviewed the current advanced directives as reflected in EPIC, the POLST and the facility chart, and verified the congruency of orders. I Did contacted the first party Juvencio and discussed the plan of care. I did not due to cognitive impairment review the advance directives with the resident.     Team Discussion:  I communicated with the appropriate disciplines involved with the Plan of Care: Nursing  .   Patient's goal is: pain control and comfort.  Information reviewed: Medications, vital signs,  "orders, and nursing notes.    ROS:  Unobtainable secondary to cognitive impairment.     Vitals:  /74   Pulse 70   Temp 97.3  F (36.3  C)   Resp 15   Ht 1.549 m (5' 1\")   Wt 58.5 kg (129 lb)   SpO2 96%   BMI 24.37 kg/m   Body mass index is 24.37 kg/m .  Exam:  A & O x1 , NAD. Lungs CTA, non labored. RRR, S1/S2 w/o murmur,gallop or rub.  no edema.  Abdomen soft, nontender, +BT'S. No focal neurological deficits.        Lab/Diagnostic data:   Recent labs in [facility] reviewed by me today.   Lab Results   Component Value Date    WBC 8.3 01/08/2024     Lab Results   Component Value Date    RBC 4.83 01/08/2024     Lab Results   Component Value Date    HGB 14.1 01/08/2024     Lab Results   Component Value Date    HCT 43.5 01/08/2024     Lab Results   Component Value Date    MCV 90 01/08/2024     Lab Results   Component Value Date    MCH 29.2 01/08/2024     Lab Results   Component Value Date    MCHC 32.4 01/08/2024     Lab Results   Component Value Date    RDW 13.8 01/08/2024     Lab Results   Component Value Date     01/08/2024     Last Comprehensive Metabolic Panel:  Lab Results   Component Value Date     01/08/2024    POTASSIUM 4.4 01/08/2024    CHLORIDE 99 01/08/2024    CO2 26 01/08/2024    ANIONGAP 13 01/08/2024     (H) 01/08/2024    BUN 11.3 01/08/2024    CR 0.67 01/08/2024    GFRESTIMATED >90 01/08/2024    YOVANA 9.9 01/08/2024     ASSESSMENT/PLAN  (F03.B0) Moderate dementia, unspecified dementia type, unspecified whether behavioral, psychotic, or mood disturbance or anxiety (H)  (primary encounter diagnosis)  Comment: Chronic, progressive.  Patient is without behaviors at this time.  Plan:   -Continue assisted living memory care support with medication administration, meals, safety.  Expect further decline with progression of disease including weight loss.    (E03.9) Acquired hypothyroidism  Comment:   TSH   Date Value Ref Range Status   04/29/2024 2.97 0.30 - 4.20 uIU/mL Final   Plan: "   -Continue levothyroxine 25 mcg a day.  TSH yearly and as needed.    (I10) Benign essential hypertension  Comment: Chronic, stable.  Plan:   -Lisinopril 20 mg p.o. daily  -Goal /90    (R52) Pain  Comment: No pain at this time  Plan: Continue with plan of care no changes at this time, adjustment as needed    (K21.00) Gastroesophageal reflux disease with esophagitis without hemorrhage  Comment: Stable on medication.  Chronic in nature and patient has failed GDR in the past.  Plan: Continue omeprazole 30 mg p.o. daily    (E44.1) Mild protein malnutrition (H24)  Comment: Body mass index is 24.37 kg/m .  Plan: Encourage healthy meals and snacks.    Orders:  The current medical regimen is effective; continue present plan and medications.    66 minutes spent on the date of the encounter doing chart review, history and exam, documentation and further activities as noted above.       Electronically signed by:  Morena Omer NP           Sincerely,        Morena Omer NP

## 2024-08-13 NOTE — PROGRESS NOTES
[facility] GERIATRICS  Chief Complaint   Patient presents with    Annual Comprehensive Nursing Home     [place] Medical Record Number:  [id number]  Place of Service where encounter took place:  [facility] ([facility]) [[id number]]    HPI:    Milena Kim  is a 69 year old  (1955), who is being seen today for an annual comprehensive visit. HPI information obtained from: facility chart records, facility staff, and patient report.   Patient sitting in general [facility] area and participating with group activity.  Cooperative with writer's exam.  Talk to writer about being a  and enjoying playing the piano.  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  HPI difficult to obtain secondary to baseline cognitive impairment, but patient denies pain, chest pain, shortness of breath and lightheadedness.  Writer talked with son Juvencio who had no concerns at this time.    Vital signs reviewed by me today  BP Readings from Last 3 Encounters:   08/13/24 127/74   04/01/24 (!) 161/72   03/04/24 133/75     Pulse Readings from Last 4 Encounters:   08/13/24 70   04/01/24 74   03/04/24 79   01/29/24 74     Wt Readings from Last 4 Encounters:   08/13/24 58.5 kg (129 lb)   04/01/24 59.7 kg (131 lb 9.6 oz)   03/04/24 60 kg (132 lb 3.2 oz)   01/29/24 65.8 kg (145 lb)         ALLERGIES: Penicillins  PAST MEDICAL HISTORY:   Past Medical History:   Diagnosis Date    Aftercare following right hip joint replacement surgery     Benign essential HTN     Cognitive and behavioral changes     Dementia without behavioral disturbance, psychotic disturbance, mood disturbance, or anxiety (H)     Ductal carcinoma in situ of right breast     Elevated platelet count     Gastroesophageal reflux disease, unspecified whether esophagitis present     Greater trochanteric pain syndrome of right lower extremity     History of abnormal mammogram     Hyperlipemia     Hypothyroidism     Lumbosacral  strain, initial encounter     Osteoarthrosis     Osteopenia     Overweight (BMI 25.0-29.9)     Primary localized osteoarthrosis of the hip, right     Recurrent major depression (H24)     Managed by [facility]    Thyroid nodule     FNA Negitive 8/5/21    Trochanteric bursitis of right hip     Visit for screening mammogram       PAST SURGICAL HISTORY:  has a past surgical history that includes appendectomy; Gallbladder surgery; hip surgery; Hysterectomy; Lumpectomy breast (Right, 10/13/2023); oral surgery tooth extraction wisdom; sinsus surgery; and tonsillectomy.      Current Outpatient Medications:     acetaminophen (TYLENOL) 500 MG tablet, Take 2 tablets (1,000 mg) by mouth 2 times daily as needed for mild pain, Disp: , Rfl:     donepezil (ARICEPT) 10 MG tablet, Take 10 mg by mouth daily, Disp: , Rfl:     fexofenadine (ALLEGRA) 180 MG tablet, Take 180 mg by mouth daily as needed for allergies, Disp: , Rfl:     ibuprofen (ADVIL/MOTRIN) 200 MG tablet, Take 200-400 mg by mouth 3 times daily as needed for pain, Disp: , Rfl:     levothyroxine (SYNTHROID/LEVOTHROID) 25 MCG tablet, TAKE 1 TABLET BY MOUTH ONCE DAILY, Disp: 90 tablet, Rfl: 97    levothyroxine (SYNTHROID/LEVOTHROID) 50 MCG tablet, Take 25 mcg by mouth daily, Disp: , Rfl:     Lidocaine (LIDOCARE) 4 % Patch, Place 1 patch onto the skin every 24 hours To prevent lidocaine toxicity, patient should be patch free for 12 hrs daily., Disp: , Rfl:     lisinopril (ZESTRIL) 20 MG tablet, Take 20 mg by mouth daily, Disp: , Rfl:     memantine (NAMENDA) 10 MG tablet, Take 1 tablet (10 mg) by mouth 2 times daily, Disp: , Rfl:     Multiple Vitamin (TAB-A-GAUDENCIO) TABS, TAKE 1 TABLET BY MOUTH ONCE DAILY, Disp: 90 tablet, Rfl: 97    multivitamin w/minerals (THERA-VIT-M) tablet, Take 1 tablet by mouth daily, Disp: , Rfl:     Omega-3 Fatty Acids (FISH OIL) 1200 MG CPDR, Take 1,200 mg by mouth daily with food, Disp: , Rfl:     Omega-3 Fatty Acids (OMEGA-3 FISH OIL)  1200 MG CAPS, TAKE 1 CAPSULE BY MOUTH ONCE DAILY, Disp: 90 capsule, Rfl: 97    omeprazole (PRILOSEC) 10 MG DR capsule, TAKE THREE CAPSULES (30MG) BY MOUTH ONCE DAILY, Disp: 270 capsule, Rfl: 97    omeprazole (PRILOSEC) 20 MG DR capsule, Take 30 mg by mouth daily, Disp: , Rfl:     simvastatin (ZOCOR) 40 MG tablet, Take 40 mg by mouth at bedtime, Disp: , Rfl:     traZODone (DESYREL) 50 MG tablet, TAKE ONE AND ONE-HALF TABLETS (75MG) BY MOUTH EVERY NIGHT AT BEDTIME, Disp: 135 tablet, Rfl: 97    venlafaxine (EFFEXOR XR) 75 MG 24 hr capsule, Take 75 mg by mouth daily, Disp: , Rfl:     vitamin E (TOCOPHEROL) 400 units (180 mg) capsule, Take 400 Units by mouth daily, Disp: , Rfl:     Vitamin E 180 MG (400 UNIT) CAPS, TAKE 1 CAPSULE BY MOUTH ONCE DAILY, Disp: 90 capsule, Rfl: 97     MED REC REQUIRED  Post Medication Reconciliation Status: patient was not discharged from an inpatient facility or TCU      Case Management:  I have reviewed the Assisted Living care plan, current immunizations and preventive care/cancer screening.. Future cancer screening is not clinically indicated secondary to age/goals of care. Patient's desire to return to the community is not present. Current Level of Care is appropriate.mhgeroimmunization: Annual Influenza per facility protocol    Advance Directive Discussion:    I reviewed the current advanced directives as reflected in EPIC, the POLST and the facility chart, and verified the congruency of orders. I Did contacted the first party Juvencio and discussed the plan of care. I did not due to cognitive impairment review the advance directives with the resident.     Team Discussion:  I communicated with the appropriate disciplines involved with the Plan of Care: Nursing  .   Patient's goal is: pain control and comfort.  Information reviewed: Medications, vital signs, orders, and nursing notes.    ROS:  Unobtainable secondary to cognitive impairment.     Vitals:  /74   Pulse 70   Temp 97.3  F  "(36.3  C)   Resp 15   Ht 1.549 m (5' 1\")   Wt 58.5 kg (129 lb)   SpO2 96%   BMI 24.37 kg/m   Body mass index is 24.37 kg/m .  Exam:  A & O x1 , NAD. Lungs CTA, non labored. RRR, S1/S2 w/o murmur,gallop or rub.  no edema.  Abdomen soft, nontender, +BT'S. No focal neurological deficits.        Lab/Diagnostic data:   Recent labs in [facility] reviewed by me today.   Lab Results   Component Value Date    WBC 8.3 01/08/2024     Lab Results   Component Value Date    RBC 4.83 01/08/2024     Lab Results   Component Value Date    HGB 14.1 01/08/2024     Lab Results   Component Value Date    HCT 43.5 01/08/2024     Lab Results   Component Value Date    MCV 90 01/08/2024     Lab Results   Component Value Date    MCH 29.2 01/08/2024     Lab Results   Component Value Date    MCHC 32.4 01/08/2024     Lab Results   Component Value Date    RDW 13.8 01/08/2024     Lab Results   Component Value Date     01/08/2024     Last Comprehensive Metabolic Panel:  Lab Results   Component Value Date     01/08/2024    POTASSIUM 4.4 01/08/2024    CHLORIDE 99 01/08/2024    CO2 26 01/08/2024    ANIONGAP 13 01/08/2024     (H) 01/08/2024    BUN 11.3 01/08/2024    CR 0.67 01/08/2024    GFRESTIMATED >90 01/08/2024    YOVANA 9.9 01/08/2024     ASSESSMENT/PLAN  (F03.B0) Moderate dementia, unspecified dementia type, unspecified whether behavioral, psychotic, or mood disturbance or anxiety (H)  (primary encounter diagnosis)  Comment: Chronic, progressive.  Patient is without behaviors at this time.  Plan:   -Continue assisted living memory care support with medication administration, meals, safety.  Expect further decline with progression of disease including weight loss.    (E03.9) Acquired hypothyroidism  Comment:   TSH   Date Value Ref Range Status   04/29/2024 2.97 0.30 - 4.20 uIU/mL Final   Plan:   -Continue levothyroxine 25 mcg a day.  TSH yearly and as needed.    (I10) Benign essential hypertension  Comment: Chronic, stable.  Plan: "   -Lisinopril 20 mg p.o. daily  -Goal /90    (R52) Pain  Comment: No pain at this time  Plan: Continue with plan of care no changes at this time, adjustment as needed    (K21.00) Gastroesophageal reflux disease with esophagitis without hemorrhage  Comment: Stable on medication.  Chronic in nature and patient has failed GDR in the past.  Plan: Continue omeprazole 30 mg p.o. daily    (E44.1) Mild protein malnutrition (H24)  Comment: Body mass index is 24.37 kg/m .  Plan: Encourage healthy meals and snacks.    Orders:  The current medical regimen is effective; continue present plan and medications.    66 minutes spent on the date of the encounter doing chart review, history and exam, documentation and further activities as noted above.       Electronically signed by:  Morena Omer NP

## 2024-12-09 PROBLEM — I10 HYPERTENSION: Status: ACTIVE | Noted: 2024-12-09

## 2024-12-09 PROBLEM — K21.9 GERD (GASTROESOPHAGEAL REFLUX DISEASE): Status: ACTIVE | Noted: 2024-12-09

## 2024-12-09 PROBLEM — E07.9 THYROID DISEASE: Status: ACTIVE | Noted: 2024-12-09

## 2024-12-09 PROBLEM — F03.90 DEMENTIA (H): Status: ACTIVE | Noted: 2024-12-09

## 2024-12-09 PROBLEM — E78.49 OTHER HYPERLIPIDEMIA: Status: ACTIVE | Noted: 2024-12-09

## 2024-12-10 ENCOUNTER — ASSISTED LIVING VISIT (OUTPATIENT)
Dept: GERIATRICS | Facility: CLINIC | Age: 69
End: 2024-12-10
Payer: MEDICARE

## 2024-12-10 VITALS
HEIGHT: 61 IN | OXYGEN SATURATION: 99 % | HEART RATE: 79 BPM | TEMPERATURE: 97.1 F | WEIGHT: 131.2 LBS | RESPIRATION RATE: 18 BRPM | SYSTOLIC BLOOD PRESSURE: 157 MMHG | BODY MASS INDEX: 24.77 KG/M2 | DIASTOLIC BLOOD PRESSURE: 69 MMHG

## 2024-12-10 DIAGNOSIS — G30.1 MODERATE LATE ONSET ALZHEIMER'S DEMENTIA WITH MOOD DISTURBANCE (H): Primary | ICD-10-CM

## 2024-12-10 DIAGNOSIS — G47.9 DIFFICULTY SLEEPING: ICD-10-CM

## 2024-12-10 DIAGNOSIS — E03.9 HYPOTHYROIDISM, UNSPECIFIED: ICD-10-CM

## 2024-12-10 DIAGNOSIS — E03.9 ACQUIRED HYPOTHYROIDISM: ICD-10-CM

## 2024-12-10 DIAGNOSIS — I10 PRIMARY HYPERTENSION: ICD-10-CM

## 2024-12-10 DIAGNOSIS — E78.49 OTHER HYPERLIPIDEMIA: ICD-10-CM

## 2024-12-10 DIAGNOSIS — F02.B3 MODERATE LATE ONSET ALZHEIMER'S DEMENTIA WITH MOOD DISTURBANCE (H): Primary | ICD-10-CM

## 2024-12-10 PROCEDURE — 99349 HOME/RES VST EST MOD MDM 40: CPT | Performed by: NURSE PRACTITIONER

## 2024-12-10 NOTE — PROGRESS NOTES
[facility]  Chief Complaint   Patient presents with    RECHECK     [place] Medical Record Number:  [id number]  Place of Service where encounter took place:  [facility] ([facility]) [[id number]]    HPI:    Milena Kim  is 69 year old (1955), who is being seen today for a recheck of chronic medical problems and medication management.   Problems Addressed today are:     Moderate late onset Alzheimer's dementia with mood disturbance (H)  Other hyperlipidemia  Acquired hypothyroidism  Difficulty sleeping  Primary hypertension  Hypothyroidism, unspecified    Nursing reports no concerns.  Patient reports HPI and ROS limited due to dementia.     BP Readings from Last 3 Encounters:   12/10/24 (!) 157/69   08/13/24 127/74   04/01/24 (!) 161/72     Wt Readings from Last 4 Encounters:   12/10/24 59.5 kg (131 lb 3.2 oz)   08/13/24 58.5 kg (129 lb)   04/01/24 59.7 kg (131 lb 9.6 oz)   03/04/24 60 kg (132 lb 3.2 oz)     Vitamins were previously discontinued but not reflected in epic. Epic medication list reconciled.     ALLERGIES:Penicillins    Patient Active Problem List    Diagnosis Date Noted    Acquired hypothyroidism 12/11/2024     Priority: Medium    Difficulty sleeping 12/11/2024     Priority: Medium    GERD (gastroesophageal reflux disease) 12/09/2024     Priority: Medium    Primary hypertension 12/09/2024     Priority: Medium    Other hyperlipidemia 12/09/2024     Priority: Medium    Moderate late onset Alzheimer's dementia with mood disturbance (H) 04/01/2024     Priority: Medium    Hx of abnormal mammogram 12/11/2023     Priority: Medium    Overweight (BMI 25.0-29.9) 12/11/2023     Priority: Medium       Current Outpatient Medications   Medication Sig Dispense Refill    acetaminophen (TYLENOL) 500 MG tablet Take 2 tablets (1,000 mg) by mouth 2 times daily as needed for mild pain      donepezil (ARICEPT) 10 MG tablet TAKE 1 TABLET BY MOUTH ONCE DAILY 90 tablet 3    fexofenadine  "(ALLEGRA) 180 MG tablet Take 180 mg by mouth daily as needed for allergies      levothyroxine (SYNTHROID/LEVOTHROID) 25 MCG tablet Take 0.5 tablets (12.5 mcg) by mouth daily. 90 tablet 97    lisinopril (ZESTRIL) 20 MG tablet Take 20 mg by mouth daily      memantine (NAMENDA) 10 MG tablet TAKE 1 TABLET BY MOUTH TWICE DAILY 60 tablet 11    omeprazole (PRILOSEC) 10 MG DR capsule TAKE THREE CAPSULES (30MG) BY MOUTH ONCE DAILY 270 capsule 97    simvastatin (ZOCOR) 40 MG tablet TAKE 1 TABLET BY MOUTH ONCE DAILY 90 tablet 3    traZODone (DESYREL) 50 MG tablet TAKE ONE AND ONE-HALF TABLETS (75MG) BY MOUTH EVERY NIGHT AT BEDTIME 135 tablet 97    venlafaxine (EFFEXOR XR) 75 MG 24 hr capsule Take 1 capsule (75 mg) by mouth daily.       No current facility-administered medications for this visit.     Case Management:  I have reviewed the facility med list.    ROS:  4 point ROS including Respiratory, CV, GI and , other than that noted in the HPI,  is negative    Vitals:  BP (!) 157/69   Pulse 79   Temp 97.1  F (36.2  C)   Resp 18   Ht 1.549 m (5' 1\")   Wt 59.5 kg (131 lb 3.2 oz)   SpO2 99%   BMI 24.79 kg/m    Body mass index is 24.79 kg/m .  Exam:  GENERAL APPEARANCE:  Alert, in no distress  RESP:  respiratory effort normal  CV:  edema none  M/S:  Gait and station ambulates ind, no tenderness or swelling of the joints   SKIN:  Inspection and palpation of skin and subcutaneous tissue at baseline  PSYCH:  insight and judgement, memory impaired, affect and mood normal    ASSESSMENT/PLAN:  Moderate late onset Alzheimer's dementia with mood disturbance (H)  Stable.  *resides in locked memory care unit  *continues on aricept and namenda  *mood concerns managed with effexor and trazodone.   - no changes    Other hyperlipidemia  No recent lipid profile.   *simvastatin 40 mg   - check lipid panel in 2 months with routine labs.     Acquired hypothyroidism  Goal TSH 6-8. Decrease dose, likely can discontinue it.   - levothyroxine " (SYNTHROID/LEVOTHROID) 25 MCG tablet; Take 0.5 tablets (12.5 mcg) by mouth daily.    Difficulty sleeping  No current concerns  - continue trazodone.     Primary hypertension  A little higher than goal today.  *monthly vital signs  *if elevated at next visit increase lisinopil.       Communication: with nursing and left message with [facility]  Follow up: in 2 months with lab check    Orders:   -Change levothyroxine to 12.5 mg po every day dx hypothyroidism  -check TSH, lipid panel, hgb, BMP, in 2 months      Electronically signed by: Antoinette Dent NP

## 2024-12-10 NOTE — LETTER
12/10/2024      Milena Kim  [address]  [facility] [address]         [facility]  Chief Complaint   Patient presents with     RECHECK     [place] Medical Record Number:  [id number]  Place of Service where encounter took place:  [facility] ([facility]) [[id number]]    HPI:    Milena Kim  is 69 year old (1955), who is being seen today for a recheck of chronic medical problems and medication management.   Problems Addressed today are:     Moderate late onset Alzheimer's dementia with mood disturbance (H)  Other hyperlipidemia  Acquired hypothyroidism  Difficulty sleeping  Primary hypertension  Hypothyroidism, unspecified    Nursing reports no concerns.  Patient reports HPI and ROS limited due to dementia.     BP Readings from Last 3 Encounters:   12/10/24 (!) 157/69   08/13/24 127/74   04/01/24 (!) 161/72     Wt Readings from Last 4 Encounters:   12/10/24 59.5 kg (131 lb 3.2 oz)   08/13/24 58.5 kg (129 lb)   04/01/24 59.7 kg (131 lb 9.6 oz)   03/04/24 60 kg (132 lb 3.2 oz)     Vitamins were previously discontinued but not reflected in epic. Epic medication list reconciled.     ALLERGIES:Penicillins    Patient Active Problem List    Diagnosis Date Noted     Acquired hypothyroidism 12/11/2024     Priority: Medium     Difficulty sleeping 12/11/2024     Priority: Medium     GERD (gastroesophageal reflux disease) 12/09/2024     Priority: Medium     Primary hypertension 12/09/2024     Priority: Medium     Other hyperlipidemia 12/09/2024     Priority: Medium     Moderate late onset Alzheimer's dementia with mood disturbance (H) 04/01/2024     Priority: Medium     Hx of abnormal mammogram 12/11/2023     Priority: Medium     Overweight (BMI 25.0-29.9) 12/11/2023     Priority: Medium       Current Outpatient Medications   Medication Sig Dispense Refill     acetaminophen (TYLENOL) 500 MG tablet Take 2 tablets (1,000 mg) by mouth 2 times daily as needed for mild pain        "donepezil (ARICEPT) 10 MG tablet TAKE 1 TABLET BY MOUTH ONCE DAILY 90 tablet 3     fexofenadine (ALLEGRA) 180 MG tablet Take 180 mg by mouth daily as needed for allergies       levothyroxine (SYNTHROID/LEVOTHROID) 25 MCG tablet Take 0.5 tablets (12.5 mcg) by mouth daily. 90 tablet 97     lisinopril (ZESTRIL) 20 MG tablet Take 20 mg by mouth daily       memantine (NAMENDA) 10 MG tablet TAKE 1 TABLET BY MOUTH TWICE DAILY 60 tablet 11     omeprazole (PRILOSEC) 10 MG DR capsule TAKE THREE CAPSULES (30MG) BY MOUTH ONCE DAILY 270 capsule 97     simvastatin (ZOCOR) 40 MG tablet TAKE 1 TABLET BY MOUTH ONCE DAILY 90 tablet 3     traZODone (DESYREL) 50 MG tablet TAKE ONE AND ONE-HALF TABLETS (75MG) BY MOUTH EVERY NIGHT AT BEDTIME 135 tablet 97     venlafaxine (EFFEXOR XR) 75 MG 24 hr capsule Take 1 capsule (75 mg) by mouth daily.       No current facility-administered medications for this visit.     Case Management:  I have reviewed the facility med list.    ROS:  4 point ROS including Respiratory, CV, GI and , other than that noted in the HPI,  is negative    Vitals:  BP (!) 157/69   Pulse 79   Temp 97.1  F (36.2  C)   Resp 18   Ht 1.549 m (5' 1\")   Wt 59.5 kg (131 lb 3.2 oz)   SpO2 99%   BMI 24.79 kg/m    Body mass index is 24.79 kg/m .  Exam:  GENERAL APPEARANCE:  Alert, in no distress  RESP:  respiratory effort normal  CV:  edema none  M/S:  Gait and station ambulates ind, no tenderness or swelling of the joints   SKIN:  Inspection and palpation of skin and subcutaneous tissue at baseline  PSYCH:  insight and judgement, memory impaired, affect and mood normal    ASSESSMENT/PLAN:  Moderate late onset Alzheimer's dementia with mood disturbance (H)  Stable.  *resides in locked memory care unit  *continues on aricept and namenda  *mood concerns managed with effexor and trazodone.   - no changes    Other hyperlipidemia  No recent lipid profile.   *simvastatin 40 mg   - check lipid panel in 2 months with routine labs. "     Acquired hypothyroidism  Goal TSH 6-8. Decrease dose, likely can discontinue it.   - levothyroxine (SYNTHROID/LEVOTHROID) 25 MCG tablet; Take 0.5 tablets (12.5 mcg) by mouth daily.    Difficulty sleeping  No current concerns  - continue trazodone.     Primary hypertension  A little higher than goal today.  *monthly vital signs  *if elevated at next visit increase lisinopil.       Communication: with nursing and left message with [facility]  Follow up: in 2 months with lab check    Orders:   -Change levothyroxine to 12.5 mg po every day dx hypothyroidism  -check TSH, lipid panel, hgb, BMP, in 2 months      Electronically signed by: Antoinette Dent NP          Sincerely,        Antoinette Dent NP

## 2024-12-11 DIAGNOSIS — F03.90 DEMENTIA (H): ICD-10-CM

## 2024-12-11 DIAGNOSIS — E78.5 HLD (HYPERLIPIDEMIA): Primary | ICD-10-CM

## 2024-12-11 PROBLEM — G47.9 DIFFICULTY SLEEPING: Status: ACTIVE | Noted: 2024-12-11

## 2024-12-11 PROBLEM — M70.61 TROCHANTERIC BURSITIS OF RIGHT HIP: Status: RESOLVED | Noted: 2023-12-11 | Resolved: 2024-12-11

## 2024-12-11 PROBLEM — R41.89 COGNITIVE AND BEHAVIORAL CHANGES: Status: RESOLVED | Noted: 2023-12-11 | Resolved: 2024-12-11

## 2024-12-11 PROBLEM — R46.89 COGNITIVE AND BEHAVIORAL CHANGES: Status: RESOLVED | Noted: 2023-12-11 | Resolved: 2024-12-11

## 2024-12-11 PROBLEM — F02.B0 MODERATE LATE ONSET ALZHEIMER'S DEMENTIA WITHOUT BEHAVIORAL DISTURBANCE, PSYCHOTIC DISTURBANCE, MOOD DISTURBANCE, OR ANXIETY (H): Status: ACTIVE | Noted: 2024-04-01

## 2024-12-11 PROBLEM — M25.552 PAIN OF LEFT HIP: Status: RESOLVED | Noted: 2023-12-11 | Resolved: 2024-12-11

## 2024-12-11 PROBLEM — E03.9 ACQUIRED HYPOTHYROIDISM: Status: ACTIVE | Noted: 2024-12-11

## 2024-12-11 PROBLEM — G30.1 MODERATE LATE ONSET ALZHEIMER'S DEMENTIA WITHOUT BEHAVIORAL DISTURBANCE, PSYCHOTIC DISTURBANCE, MOOD DISTURBANCE, OR ANXIETY (H): Status: ACTIVE | Noted: 2024-04-01

## 2024-12-11 PROBLEM — F02.B3 MODERATE LATE ONSET ALZHEIMER'S DEMENTIA WITH MOOD DISTURBANCE (H): Status: ACTIVE | Noted: 2024-04-01

## 2024-12-11 PROBLEM — F03.918 DEMENTIA WITH OTHER BEHAVIORAL DISTURBANCE, UNSPECIFIED DEMENTIA SEVERITY, UNSPECIFIED DEMENTIA TYPE (H): Status: RESOLVED | Noted: 2024-04-01 | Resolved: 2024-12-11

## 2024-12-11 PROBLEM — E07.9 THYROID DISEASE: Status: RESOLVED | Noted: 2024-12-09 | Resolved: 2024-12-11

## 2024-12-11 PROBLEM — S39.012A LUMBOSACRAL STRAIN, INITIAL ENCOUNTER: Status: RESOLVED | Noted: 2023-12-11 | Resolved: 2024-12-11

## 2024-12-11 RX ORDER — DONEPEZIL HYDROCHLORIDE 10 MG/1
10 TABLET, FILM COATED ORAL DAILY
Qty: 90 TABLET | Refills: 3 | Status: SHIPPED | OUTPATIENT
Start: 2024-12-11

## 2024-12-11 RX ORDER — VENLAFAXINE HYDROCHLORIDE 75 MG/1
75 CAPSULE, EXTENDED RELEASE ORAL DAILY
Status: SHIPPED
Start: 2024-12-11

## 2024-12-11 RX ORDER — SIMVASTATIN 40 MG
40 TABLET ORAL DAILY
Qty: 90 TABLET | Refills: 3 | Status: SHIPPED | OUTPATIENT
Start: 2024-12-11

## 2024-12-11 RX ORDER — MEMANTINE HYDROCHLORIDE 10 MG/1
10 TABLET ORAL 2 TIMES DAILY
Qty: 60 TABLET | Refills: 11 | Status: SHIPPED | OUTPATIENT
Start: 2024-12-11

## 2024-12-11 RX ORDER — LEVOTHYROXINE SODIUM 25 UG/1
12.5 TABLET ORAL DAILY
Qty: 90 TABLET | Refills: 97 | Status: SHIPPED | OUTPATIENT
Start: 2024-12-11

## 2024-12-16 DIAGNOSIS — J30.2 SEASONAL ALLERGIC RHINITIS: Primary | ICD-10-CM

## 2024-12-16 RX ORDER — FEXOFENADINE HCL 180 MG/1
180 TABLET ORAL DAILY PRN
Qty: 30 TABLET | Refills: 4 | Status: SHIPPED | OUTPATIENT
Start: 2024-12-16

## 2024-12-30 DIAGNOSIS — G30.1 MODERATE LATE ONSET ALZHEIMER'S DEMENTIA WITH MOOD DISTURBANCE (H): ICD-10-CM

## 2024-12-30 DIAGNOSIS — I10 PRIMARY HYPERTENSION: Primary | ICD-10-CM

## 2024-12-30 DIAGNOSIS — F02.B3 MODERATE LATE ONSET ALZHEIMER'S DEMENTIA WITH MOOD DISTURBANCE (H): ICD-10-CM

## 2024-12-30 RX ORDER — VENLAFAXINE HYDROCHLORIDE 75 MG/1
75 CAPSULE, EXTENDED RELEASE ORAL DAILY
Qty: 90 CAPSULE | Refills: 3 | Status: SHIPPED | OUTPATIENT
Start: 2024-12-30

## 2024-12-30 RX ORDER — LISINOPRIL 20 MG/1
20 TABLET ORAL DAILY
Qty: 90 TABLET | Refills: 3 | Status: SHIPPED | OUTPATIENT
Start: 2024-12-30

## 2025-02-04 ENCOUNTER — ASSISTED LIVING VISIT (OUTPATIENT)
Dept: GERIATRICS | Facility: CLINIC | Age: 70
End: 2025-02-04
Payer: MEDICARE

## 2025-02-04 ENCOUNTER — LAB REQUISITION (OUTPATIENT)
Dept: LAB | Facility: CLINIC | Age: 70
End: 2025-02-04
Payer: MEDICARE

## 2025-02-04 VITALS
HEART RATE: 69 BPM | DIASTOLIC BLOOD PRESSURE: 69 MMHG | TEMPERATURE: 97.5 F | SYSTOLIC BLOOD PRESSURE: 157 MMHG | WEIGHT: 130.6 LBS | RESPIRATION RATE: 18 BRPM | BODY MASS INDEX: 24.68 KG/M2

## 2025-02-04 DIAGNOSIS — I10 PRIMARY HYPERTENSION: ICD-10-CM

## 2025-02-04 DIAGNOSIS — G30.1 MODERATE LATE ONSET ALZHEIMER'S DEMENTIA WITH MOOD DISTURBANCE (H): Primary | ICD-10-CM

## 2025-02-04 DIAGNOSIS — G47.9 DIFFICULTY SLEEPING: ICD-10-CM

## 2025-02-04 DIAGNOSIS — E78.5 HYPERLIPIDEMIA, UNSPECIFIED: ICD-10-CM

## 2025-02-04 DIAGNOSIS — E78.49 OTHER HYPERLIPIDEMIA: ICD-10-CM

## 2025-02-04 DIAGNOSIS — F02.B3 MODERATE LATE ONSET ALZHEIMER'S DEMENTIA WITH MOOD DISTURBANCE (H): Primary | ICD-10-CM

## 2025-02-04 DIAGNOSIS — E03.9 ACQUIRED HYPOTHYROIDISM: ICD-10-CM

## 2025-02-04 DIAGNOSIS — E03.9 HYPOTHYROIDISM, UNSPECIFIED: ICD-10-CM

## 2025-02-04 DIAGNOSIS — I10 ESSENTIAL (PRIMARY) HYPERTENSION: ICD-10-CM

## 2025-02-04 PROCEDURE — 99348 HOME/RES VST EST LOW MDM 30: CPT | Performed by: NURSE PRACTITIONER

## 2025-02-04 NOTE — LETTER
2/4/2025      Milena Kim  [address]  [facility] [address]         [facility] G[facility]ATR[facility]  Chief Complaint   Patient presents with     RECHECK     [place] Medical Record Number:  [id number]  Place of Service where encounter took place:  [facility] ([facility]) [[id number]]    HPI:    Milena Kim  is 69 year old (1955), who is being seen today for a recheck of chronic medical problems and medication management.   Problems Addressed today are:     Moderate late onset Alzheimer's dementia with mood disturbance (H)  Other hyperlipidemia  Acquired hypothyroidism  Difficulty sleeping  Primary hypertension    Nursing reports no concerns.  Patient reports HPI and ROS limited due to dementia.     BP Readings from Last 3 Encounters:   02/04/25 (!) 157/69   12/10/24 (!) 157/69   08/13/24 127/74     Wt Readings from Last 4 Encounters:   02/04/25 59.2 kg (130 lb 9.6 oz)   12/10/24 59.5 kg (131 lb 3.2 oz)   08/13/24 58.5 kg (129 lb)   04/01/24 59.7 kg (131 lb 9.6 oz)       ALLERGIES:Penicillins    Patient Active Problem List    Diagnosis Date Noted     Acquired hypothyroidism 12/11/2024     Priority: Medium     Difficulty sleeping 12/11/2024     Priority: Medium     GERD (gastroesophageal reflux disease) 12/09/2024     Priority: Medium     Primary hypertension 12/09/2024     Priority: Medium     Other hyperlipidemia 12/09/2024     Priority: Medium     Moderate late onset Alzheimer's dementia with mood disturbance (H) 04/01/2024     Priority: Medium     Hx of abnormal mammogram 12/11/2023     Priority: Medium     Overweight (BMI 25.0-29.9) 12/11/2023     Priority: Medium       Current Outpatient Medications   Medication Sig Dispense Refill     acetaminophen (TYLENOL) 500 MG tablet Take 2 tablets (1,000 mg) by mouth 2 times daily as needed for mild pain       donepezil (ARICEPT) 10 MG tablet TAKE 1 TABLET BY MOUTH ONCE DAILY 90 tablet 3     fexofenadine (ALLEGRA) 180 MG tablet Take 1 tablet  (180 mg) by mouth daily as needed for allergies. 30 tablet 4     levothyroxine (SYNTHROID/LEVOTHROID) 25 MCG tablet Take 0.5 tablets (12.5 mcg) by mouth daily. 90 tablet 97     lisinopril (ZESTRIL) 20 MG tablet Take 1 tablet (20 mg) by mouth daily. 90 tablet 3     memantine (NAMENDA) 10 MG tablet TAKE 1 TABLET BY MOUTH TWICE DAILY 60 tablet 11     omeprazole (PRILOSEC) 10 MG DR capsule TAKE THREE CAPSULES (30MG) BY MOUTH ONCE DAILY 270 capsule 97     simvastatin (ZOCOR) 40 MG tablet TAKE 1 TABLET BY MOUTH ONCE DAILY 90 tablet 3     traZODone (DESYREL) 50 MG tablet TAKE ONE AND ONE-HALF TABLETS (75MG) BY MOUTH EVERY NIGHT AT BEDTIME 135 tablet 97     venlafaxine (EFFEXOR XR) 75 MG 24 hr capsule Take 1 capsule (75 mg) by mouth daily. 90 capsule 3     No current facility-administered medications for this visit.     Case Management:  I have reviewed the facility med list.    ROS:  4 point ROS including Respiratory, CV, GI and , other than that noted in the HPI,  is negative    Vitals:  BP (!) 157/69   Pulse 69   Temp 97.5  F (36.4  C)   Resp 18   Wt 59.2 kg (130 lb 9.6 oz)   BMI 24.68 kg/m    Body mass index is 24.68 kg/m .  Exam:  GENERAL APPEARANCE:  Alert, in no distress  RESP:  respiratory effort normal, lung sounds clear  CV:  edema none. Hear RRR  M/S:  Gait and station ambulates ind, no tenderness or swelling of the joints   SKIN:  Inspection and palpation of skin and subcutaneous tissue at baseline  PSYCH:  insight and judgement, memory impaired, affect and mood normal    ASSESSMENT/PLAN:  Moderate late onset Alzheimer's dementia with mood disturbance (H)  Stable.  *resides in locked memory care unit  *continues on aricept and namenda  *mood concerns managed with effexor and trazodone.   - no changes    Other hyperlipidemia  No recent lipid profile.   *simvastatin 40 mg   - labs pending    Acquired hypothyroidism  Goal TSH 6-8. Decrease dose, likely can discontinue it.   - levothyroxine  (SYNTHROID/LEVOTHROID) 25 MCG tablet; Take 0.5 tablets (12.5 mcg) by mouth daily.  - labs pending    Difficulty sleeping  No current concerns  - continue trazodone.     Primary hypertension  Goal relaxed due to goals of care.   - continue to monitor.     Communication: facility staff  Follow up: in 3 months    Orders:   No changes      Electronically signed by: Antoinette Dent NP          Sincerely,        Antoinette Dent NP    Electronically signed

## 2025-02-06 NOTE — PROGRESS NOTES
[facility]ATR[facility]  Chief Complaint   Patient presents with    RECHECK     [place] Medical Record Number:  [id number]  Place of Service where encounter took place:  [facility] ([facility]) [[id number]]    HPI:    Milena Kim  is 69 year old (1955), who is being seen today for a recheck of chronic medical problems and medication management.   Problems Addressed today are:     Moderate late onset Alzheimer's dementia with mood disturbance (H)  Other hyperlipidemia  Acquired hypothyroidism  Difficulty sleeping  Primary hypertension    Nursing reports no concerns.  Patient reports HPI and ROS limited due to dementia.     BP Readings from Last 3 Encounters:   02/04/25 (!) 157/69   12/10/24 (!) 157/69   08/13/24 127/74     Wt Readings from Last 4 Encounters:   02/04/25 59.2 kg (130 lb 9.6 oz)   12/10/24 59.5 kg (131 lb 3.2 oz)   08/13/24 58.5 kg (129 lb)   04/01/24 59.7 kg (131 lb 9.6 oz)       ALLERGIES:Penicillins    Patient Active Problem List    Diagnosis Date Noted    Acquired hypothyroidism 12/11/2024     Priority: Medium    Difficulty sleeping 12/11/2024     Priority: Medium    GERD (gastroesophageal reflux disease) 12/09/2024     Priority: Medium    Primary hypertension 12/09/2024     Priority: Medium    Other hyperlipidemia 12/09/2024     Priority: Medium    Moderate late onset Alzheimer's dementia with mood disturbance (H) 04/01/2024     Priority: Medium    Hx of abnormal mammogram 12/11/2023     Priority: Medium    Overweight (BMI 25.0-29.9) 12/11/2023     Priority: Medium       Current Outpatient Medications   Medication Sig Dispense Refill    acetaminophen (TYLENOL) 500 MG tablet Take 2 tablets (1,000 mg) by mouth 2 times daily as needed for mild pain      donepezil (ARICEPT) 10 MG tablet TAKE 1 TABLET BY MOUTH ONCE DAILY 90 tablet 3    fexofenadine (ALLEGRA) 180 MG tablet Take 1 tablet (180 mg) by mouth daily as needed for allergies. 30 tablet 4    levothyroxine  (SYNTHROID/LEVOTHROID) 25 MCG tablet Take 0.5 tablets (12.5 mcg) by mouth daily. 90 tablet 97    lisinopril (ZESTRIL) 20 MG tablet Take 1 tablet (20 mg) by mouth daily. 90 tablet 3    memantine (NAMENDA) 10 MG tablet TAKE 1 TABLET BY MOUTH TWICE DAILY 60 tablet 11    omeprazole (PRILOSEC) 10 MG DR capsule TAKE THREE CAPSULES (30MG) BY MOUTH ONCE DAILY 270 capsule 97    simvastatin (ZOCOR) 40 MG tablet TAKE 1 TABLET BY MOUTH ONCE DAILY 90 tablet 3    traZODone (DESYREL) 50 MG tablet TAKE ONE AND ONE-HALF TABLETS (75MG) BY MOUTH EVERY NIGHT AT BEDTIME 135 tablet 97    venlafaxine (EFFEXOR XR) 75 MG 24 hr capsule Take 1 capsule (75 mg) by mouth daily. 90 capsule 3     No current facility-administered medications for this visit.     Case Management:  I have reviewed the facility med list.    ROS:  4 point ROS including Respiratory, CV, GI and , other than that noted in the HPI,  is negative    Vitals:  BP (!) 157/69   Pulse 69   Temp 97.5  F (36.4  C)   Resp 18   Wt 59.2 kg (130 lb 9.6 oz)   BMI 24.68 kg/m    Body mass index is 24.68 kg/m .  Exam:  GENERAL APPEARANCE:  Alert, in no distress  RESP:  respiratory effort normal, lung sounds clear  CV:  edema none. Hear RRR  M/S:  Gait and station ambulates ind, no tenderness or swelling of the joints   SKIN:  Inspection and palpation of skin and subcutaneous tissue at baseline  PSYCH:  insight and judgement, memory impaired, affect and mood normal    ASSESSMENT/PLAN:  Moderate late onset Alzheimer's dementia with mood disturbance (H)  Stable.  *resides in locked memory care unit  *continues on aricept and namenda  *mood concerns managed with effexor and trazodone.   - no changes    Other hyperlipidemia  No recent lipid profile.   *simvastatin 40 mg   - labs pending    Acquired hypothyroidism  Goal TSH 6-8. Decrease dose, likely can discontinue it.   - levothyroxine (SYNTHROID/LEVOTHROID) 25 MCG tablet; Take 0.5 tablets (12.5 mcg) by mouth daily.  - labs  pending    Difficulty sleeping  No current concerns  - continue trazodone.     Primary hypertension  Goal relaxed due to goals of care.   - continue to monitor.     Communication: facility staff  Follow up: in 3 months    Orders:   No changes      Electronically signed by: Antoinette Dent NP

## 2025-02-10 LAB
CHOLEST SERPL-MCNC: 203 MG/DL
EST. AVERAGE GLUCOSE BLD GHB EST-MCNC: 134 MG/DL
FASTING STATUS PATIENT QL REPORTED: ABNORMAL
HBA1C MFR BLD: 6.3 %
HDLC SERPL-MCNC: 67 MG/DL
LDLC SERPL CALC-MCNC: 109 MG/DL
NONHDLC SERPL-MCNC: 136 MG/DL
TRIGL SERPL-MCNC: 136 MG/DL
TSH SERPL DL<=0.005 MIU/L-ACNC: 4.31 UIU/ML (ref 0.3–4.2)

## 2025-02-20 ENCOUNTER — DOCUMENTATION ONLY (OUTPATIENT)
Dept: GERIATRICS | Facility: CLINIC | Age: 70
End: 2025-02-20
Payer: MEDICARE

## 2025-07-21 ENCOUNTER — LAB REQUISITION (OUTPATIENT)
Dept: LAB | Facility: CLINIC | Age: 70
End: 2025-07-21
Payer: MEDICARE

## 2025-07-21 DIAGNOSIS — R41.82 ALTERED MENTAL STATUS, UNSPECIFIED: ICD-10-CM

## 2025-07-21 PROCEDURE — 87086 URINE CULTURE/COLONY COUNT: CPT | Mod: ORL | Performed by: NURSE PRACTITIONER

## 2025-07-22 LAB — BACTERIA UR CULT: NORMAL

## 2025-07-23 ENCOUNTER — LAB REQUISITION (OUTPATIENT)
Dept: LAB | Facility: CLINIC | Age: 70
End: 2025-07-23
Payer: MEDICARE

## 2025-07-23 DIAGNOSIS — R45.1 RESTLESSNESS AND AGITATION: ICD-10-CM

## 2025-07-23 DIAGNOSIS — R41.0 DISORIENTATION, UNSPECIFIED: ICD-10-CM

## 2025-07-23 LAB
ALBUMIN UR-MCNC: NEGATIVE MG/DL
APPEARANCE UR: CLEAR
BILIRUB UR QL STRIP: NEGATIVE
COLOR UR AUTO: ABNORMAL
GLUCOSE UR STRIP-MCNC: NEGATIVE MG/DL
HGB UR QL STRIP: NEGATIVE
KETONES UR STRIP-MCNC: NEGATIVE MG/DL
LEUKOCYTE ESTERASE UR QL STRIP: ABNORMAL
MUCOUS THREADS #/AREA URNS LPF: PRESENT /LPF
NITRATE UR QL: NEGATIVE
PH UR STRIP: 7.5 [PH] (ref 5–7)
RBC URINE: 1 /HPF
SP GR UR STRIP: 1.01 (ref 1–1.03)
SQUAMOUS EPITHELIAL: <1 /HPF
TRANSITIONAL EPI: <1 /HPF
UROBILINOGEN UR STRIP-MCNC: NORMAL MG/DL
WBC URINE: 19 /HPF

## 2025-07-23 PROCEDURE — 87086 URINE CULTURE/COLONY COUNT: CPT | Mod: ORL | Performed by: NURSE PRACTITIONER

## 2025-07-23 PROCEDURE — 81001 URINALYSIS AUTO W/SCOPE: CPT | Mod: ORL | Performed by: NURSE PRACTITIONER

## 2025-07-24 ENCOUNTER — RESULTS FOLLOW-UP (OUTPATIENT)
Dept: GERIATRICS | Facility: CLINIC | Age: 70
End: 2025-07-24
Payer: MEDICARE

## 2025-07-24 LAB
BACTERIA UR CULT: ABNORMAL

## 2025-08-10 ENCOUNTER — HEALTH MAINTENANCE LETTER (OUTPATIENT)
Age: 70
End: 2025-08-10

## 2025-08-19 ENCOUNTER — ASSISTED LIVING VISIT (OUTPATIENT)
Dept: GERIATRICS | Facility: CLINIC | Age: 70
End: 2025-08-19
Payer: MEDICARE

## 2025-08-19 VITALS
TEMPERATURE: 97.9 F | HEART RATE: 80 BPM | RESPIRATION RATE: 18 BRPM | SYSTOLIC BLOOD PRESSURE: 149 MMHG | DIASTOLIC BLOOD PRESSURE: 76 MMHG | HEIGHT: 61 IN | BODY MASS INDEX: 23.7 KG/M2 | WEIGHT: 125.5 LBS | OXYGEN SATURATION: 93 %

## 2025-08-19 DIAGNOSIS — I10 PRIMARY HYPERTENSION: ICD-10-CM

## 2025-08-19 DIAGNOSIS — F02.B3 MODERATE LATE ONSET ALZHEIMER'S DEMENTIA WITH MOOD DISTURBANCE (H): ICD-10-CM

## 2025-08-19 DIAGNOSIS — Z00.00 ENCOUNTER FOR MEDICARE ANNUAL WELLNESS EXAM: Primary | ICD-10-CM

## 2025-08-19 DIAGNOSIS — G30.1 MODERATE LATE ONSET ALZHEIMER'S DEMENTIA WITH MOOD DISTURBANCE (H): ICD-10-CM

## 2025-08-19 PROCEDURE — 99349 HOME/RES VST EST MOD MDM 40: CPT | Mod: 25 | Performed by: NURSE PRACTITIONER

## 2025-08-19 PROCEDURE — G0438 PPPS, INITIAL VISIT: HCPCS | Performed by: NURSE PRACTITIONER

## 2025-08-19 ASSESSMENT — ACTIVITIES OF DAILY LIVING (ADL): CURRENT_FUNCTION: NEEDS ASSISTANCE

## 2025-08-20 ENCOUNTER — TELEPHONE (OUTPATIENT)
Dept: GERIATRICS | Facility: CLINIC | Age: 70
End: 2025-08-20
Payer: MEDICARE

## 2025-08-20 DIAGNOSIS — F02.B3 MODERATE LATE ONSET ALZHEIMER'S DEMENTIA WITH MOOD DISTURBANCE (H): ICD-10-CM

## 2025-08-20 DIAGNOSIS — G30.1 MODERATE LATE ONSET ALZHEIMER'S DEMENTIA WITH MOOD DISTURBANCE (H): ICD-10-CM

## 2025-08-20 RX ORDER — VENLAFAXINE HYDROCHLORIDE 150 MG/1
150 CAPSULE, EXTENDED RELEASE ORAL DAILY
COMMUNITY

## 2025-09-02 ENCOUNTER — ASSISTED LIVING VISIT (OUTPATIENT)
Dept: GERIATRICS | Facility: CLINIC | Age: 70
End: 2025-09-02
Payer: MEDICARE

## 2025-09-02 VITALS
WEIGHT: 125.5 LBS | DIASTOLIC BLOOD PRESSURE: 76 MMHG | SYSTOLIC BLOOD PRESSURE: 149 MMHG | HEART RATE: 80 BPM | OXYGEN SATURATION: 93 % | HEIGHT: 61 IN | BODY MASS INDEX: 23.7 KG/M2 | RESPIRATION RATE: 18 BRPM | TEMPERATURE: 97.9 F

## 2025-09-02 DIAGNOSIS — G30.1 MODERATE LATE ONSET ALZHEIMER'S DEMENTIA WITH MOOD DISTURBANCE (H): Primary | ICD-10-CM

## 2025-09-02 DIAGNOSIS — F33.41 MAJOR DEPRESSIVE DISORDER, RECURRENT EPISODE, IN PARTIAL REMISSION: ICD-10-CM

## 2025-09-02 DIAGNOSIS — F02.B3 MODERATE LATE ONSET ALZHEIMER'S DEMENTIA WITH MOOD DISTURBANCE (H): Primary | ICD-10-CM

## 2025-09-02 PROCEDURE — 99348 HOME/RES VST EST LOW MDM 30: CPT | Performed by: NURSE PRACTITIONER

## 2025-09-04 ENCOUNTER — DOCUMENTATION ONLY (OUTPATIENT)
Dept: OTHER | Facility: CLINIC | Age: 70
End: 2025-09-04
Payer: MEDICARE